# Patient Record
Sex: FEMALE | Race: WHITE | NOT HISPANIC OR LATINO | RURAL
[De-identification: names, ages, dates, MRNs, and addresses within clinical notes are randomized per-mention and may not be internally consistent; named-entity substitution may affect disease eponyms.]

---
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
## 2024-02-26 ENCOUNTER — TELEPHONE (OUTPATIENT)
Dept: FAMILY MEDICINE CLINIC | Facility: CLINIC | Age: 19
End: 2024-02-26
Payer: COMMERCIAL

## 2024-02-26 NOTE — TELEPHONE ENCOUNTER
"Caller: Bobo Odom \"Bobo odom\"    Relationship: Self    Best call back number: [phone] OKAY LEAVE MESSAGE    Who is your current provider: NA     Is your current provider offboarding? NO    Who would you like your new provider to be: DR KAITLYN SCHNEIDER    What are your reasons for transferring care: PATIENT DOES NOT HAVE A PCP    Additional notes: [facility] VALENCIA HAS REFERRED PATIENT TO DR SCHNEIDER.    [facility] STATED THAT HER ENTIRE FAMILY COMES TO DR SCHNEIDER AND THE MEN SEE DR CARVER .     PATIENT HAS NOT HAD PERIOD FOR THREE YEARS UNLESS TAKING BIRTH CONTROL.    HAD TONSILS REMOVED NOVEMBER 2023 AND HER NECK IS SWOLLEN.    CONCERNED SHE MAY HAVE AN ISSUE WITH HER THYROID.    "

## 2024-02-26 NOTE — TELEPHONE ENCOUNTER
Spoke with the patient, she is going to come in for a new patient visit on 03/12/2024. She has not seen gynecology ever before and she has not seen a PCP since childhood. She was prescribed birth control via a telemed doctor. She discontinued the birth control about 2-3 years ago due to nausea and she has not had a period since. She also informed me that she did follow up with the ENT 1 time after her tonsillectomy and that was it. She states she has had a swollen throat/neck since around middle school.

## 2024-02-26 NOTE — TELEPHONE ENCOUNTER
Yes, should be fine to schedule as a new patient.  Please find out if she has been seen by gynecology and if so please obtain any pertinent records.  As far as neck swelling, has she had any follow-up with ENT since her tonsillectomy?

## 2024-03-11 NOTE — PROGRESS NOTES
Chief Complaint  Establish Care and Menstrual Problem    History of Present Illness  Bobo Odom presents today to establish care and discuss irregular periods    Live with family friend Lakshmi. DO not do well living with mom. Triggers her anxiety.     She states she has had a swollen throat/neck since around middle school.  She has had tonsillectomy, she did follow up with the ENT following the tonsillectomy but did not continue to follow.    patient did have a Laparoscopic appendectomy with Dr. Almonte 6 days ago at [facility]. Have follow up on 3/20/24. They did do a pelvic exam during the evaluation of her abdominal pain.  They did start her on Zoloft while in the hospital for her anxiety. She is afraid of medication but only took once.  She is requesting to change to Lexapro because her sister is taking Lexapro and doing well with the medication.  We are calling to get this record.  Told thyroid labs were normal ovaries appeared normal without cysts on pelvic ultrasound and laparoscopic exam.     Report panic attack 2-3 times a week about 5 minutes but feel much longer. Feel the room is spinning cannot breath, hyperventilate, feel like having a heart attack and going to die, feel shaky.   Deep breathing or call mamaw and she will talk her through it.     Menstrual Irregularity:  Bobo Odom is here today to discuss  not having period in 2-3 years .  She is of childbearing age. She is sexually active. Associated symptoms include abdominal cramps. Pertinent surgical history includes none.   Periods were regular from age 13 to 15. Started birth control with onset of sexual activity. Stopped when moved to [place].   Not currently using birth control. And have never been pregnant.  She has not previously seen a gynecologist and has not seen a PCP since childhood. She was prescribed birth control via a telemed doctor. She discontinued the birth control about 3 years ago due to nausea and she has  "not had a period since.  Do have excessive facial hair stating around age 15 OCP's helped some. Since off hormones, have to wax or shave daily.     Influenza immunization was not given due to patient refusal.    Patient Care Team:  Polly Martinez MD as PCP - General (Family Medicine)   Current Outpatient Medications on File Prior to Visit   Medication Sig    amoxicillin-clavulanate (AUGMENTIN) 875-125 MG per tablet Take 1 tablet by mouth Every 12 (Twelve) Hours.    [DISCONTINUED] sertraline (ZOLOFT) 25 MG tablet Take 1 tablet by mouth Daily.    HYDROcodone-acetaminophen (NORCO) 5-325 MG per tablet Take 1 tablet by mouth Every 6 (Six) Hours As Needed. for pain (Patient not taking: Reported on 3/12/2024)     No current facility-administered medications on file prior to visit.       Objective   Vital Signs:   /74 (BP Location: Right arm, Patient Position: Sitting, Cuff Size: Adult)   Pulse (!) 124   Temp 98.2 °F (36.8 °C) (Temporal)   Resp 18   Ht 170.2 cm (67\")   Wt 87.2 kg (192 lb 3.2 oz)   SpO2 97%   BMI 30.10 kg/m²    BP Readings from Last 3 Encounters:   03/12/24 126/74     Wt Readings from Last 3 Encounters:   03/12/24 87.2 kg (192 lb 3.2 oz) (97%, Z= 1.83)*     * Growth percentiles are based on CDC (Girls, 2-20 Years) data.         Physical Exam  Vitals and nursing note reviewed.   Constitutional:       General: She is not in acute distress.     Appearance: Normal appearance. She is well-developed. She is obese. She is not ill-appearing.   HENT:      Head: Normocephalic and atraumatic.      Right Ear: Tympanic membrane, ear canal and external ear normal. There is no impacted cerumen.      Left Ear: Tympanic membrane, ear canal and external ear normal. There is no impacted cerumen.      Nose: No congestion.      Mouth/Throat:      Mouth: Mucous membranes are moist.   Eyes:      Conjunctiva/sclera: Conjunctivae normal.      Pupils: Pupils are equal, round, and reactive to light.   Neck:     " " Thyroid: No thyromegaly.      Vascular: No JVD.   Cardiovascular:      Rate and Rhythm: Normal rate and regular rhythm.      Heart sounds: Normal heart sounds. No murmur heard.  Pulmonary:      Effort: Pulmonary effort is normal.      Breath sounds: Normal breath sounds. No wheezing or rales.   Abdominal:      General: Bowel sounds are normal.      Tenderness: There is abdominal tenderness.      Comments: Truncal obesity.  Surgical bandages with Steri-Strips and gauze still in place on abdomen.  Visible trocar incision sites are dry without surrounding erythema, healing as expected.   Musculoskeletal:         General: Normal range of motion.      Cervical back: Normal range of motion.   Lymphadenopathy:      Cervical: No cervical adenopathy.   Skin:     General: Skin is warm and dry.      Findings: No rash.      Comments: Acanthosis nigricans, bases of dark hairs chin and upper lip   Neurological:      Mental Status: She is alert and oriented to person, place, and time.   Psychiatric:         Mood and Affect: Mood normal.         Behavior: Behavior normal.         Thought Content: Thought content normal.      Comments: Anxious, pressured speech            No visits with results within 1 Day(s) from this visit.   Latest known visit with results is:   No results found for any previous visit.       No results found for: \"CHOL\", \"CHLPL\", \"TRIG\", \"HDL\", \"LDL\", \"LDLDIRECT\"  No results found for: \"TSH\", \"B0FIOPJ\", \"B6AGRAO\", \"THYROIDAB\"  No results found for: \"GLUCOSE\", \"BUN\", \"CREATININE\", \"EGFRIFNONA\", \"EGFRIFAFRI\", \"BCR\", \"K\", \"CO2\", \"CALCIUM\", \"PROTENTOTREF\", \"ALBUMIN\", \"LABIL2\", \"BILIRUBIN\", \"AST\", \"ALT\"  No results found for: \"WBC\", \"HGB\", \"HCT\", \"MCV\", \"PLT\"              Labs 3/6/2024 preoperatively at [facility] CBC with white count of 20.5   lactate 3.0 urinalysis negative sodium 135, glucose 115  Labs 3/8/2024 postoperatively the day of discharge white count down to 12.1 hemoglobin " down to 0.8 hematocrit 30.6% neutrophils elevated at 63% ALT improved to 81 AST 57     Assessment and Plan    Diagnoses and all orders for this visit:    1. Encounter to establish care (Primary)    2. Amenorrhea  -     CBC auto differential  -     DHEA-sulfate  -     Ferritin  -     Follicle stimulating hormone  -     Luteinizing hormone  -     Prolactin  -     Testosterone Free Direct  -     TSH  -     Cancel: US Thyroid    3. Enlarged thyroid gland  -     Cancel: US Thyroid  -     US Thyroid; Future    4. Elevated LFTs  -     Comprehensive metabolic panel    5. Acanthosis nigricans    6. Hirsutism  -     DHEA-sulfate  -     Testosterone Free Direct    7. Anxiety  -     hydrOXYzine (ATARAX) 25 MG tablet; Take 1 tablet by mouth 3 (Three) Times a Day As Needed for Anxiety. For panic attacks  Dispense: 20 tablet; Refill: 0    8. Panic attacks  -     escitalopram (Lexapro) 10 MG tablet; 1/2 tablet for 1st 2 days (up to 1 week)  then increase to 1 daily  Dispense: 30 tablet; Refill: 0  -     hydrOXYzine (ATARAX) 25 MG tablet; Take 1 tablet by mouth 3 (Three) Times a Day As Needed for Anxiety. For panic attacks  Dispense: 20 tablet; Refill: 0    9. Current nicotine use    10. BMI 30.0-30.9,adult    11. Rapid weight gain    12. Hepatic steatosis  -     Comprehensive metabolic panel    13. Need for hepatitis C screening test  -     Hepatitis C Antibody      Patient is 1 week status post laparoscopic appendectomy.  Patient had additional testing prior to appendectomy that was helpful in her evaluation hirsutism.  Specifically she had a pelvic ultrasound that was negative for ovarian cysts or uterine masses with a endometrial stripe measuring 2.3 mm in her uterus.  CT of abdomen and pelvis with contrast noted prior and following appendectomy changes consistent with appendicitis and subsequent appendectomy and moderate to severe hepatic steatosis but no uterine or ovarian abnormalities are noted.  She did have a negative  pregnancy test.  Abnormal labs listed above.  Ordering labs as above as well as thyroid ultrasound for enlarged thyroid/goiter.  Did discuss proper postoperative wound care would recommend removing gauze type bandages and surgical tape leaving Steri-Strips in place until they start peeling up on their own and cleansing with soap and water at least once daily    Patient claims pelvic exam was performed in emergency department I cannot find record of any STI testing.  Patient is sexually active without condoms will need to do pelvic exam on return visit.    Medications Discontinued During This Encounter   Medication Reason    sertraline (ZOLOFT) 25 MG tablet Alternate therapy       I spent 70+ minutes caring for Bobo on this date of service. This time includes time spent by me in the following activities:preparing for the visit, reviewing tests, obtaining and/or reviewing a separately obtained history, performing a medically appropriate examination and/or evaluation , counseling and educating the patient/family/caregiver, ordering medications, tests, or procedures, documenting information in the medical record, and care coordination  Follow Up     Return in about 4 weeks (around 4/9/2024) for Recheck anxiety/panic, amenorrhea, hirsutism, steatohepatitis etc..    Patient was given instructions and counseling regarding her condition or for health maintenance advice. Please see specific information pulled into the AVS if appropriate.

## 2024-03-12 ENCOUNTER — TELEPHONE (OUTPATIENT)
Dept: FAMILY MEDICINE CLINIC | Facility: CLINIC | Age: 19
End: 2024-03-12

## 2024-03-12 ENCOUNTER — OFFICE VISIT (OUTPATIENT)
Dept: FAMILY MEDICINE CLINIC | Facility: CLINIC | Age: 19
End: 2024-03-12
Payer: COMMERCIAL

## 2024-03-12 ENCOUNTER — PATIENT ROUNDING (BHMG ONLY) (OUTPATIENT)
Dept: FAMILY MEDICINE CLINIC | Facility: CLINIC | Age: 19
End: 2024-03-12
Payer: COMMERCIAL

## 2024-03-12 VITALS
WEIGHT: 192.2 LBS | HEIGHT: 67 IN | HEART RATE: 124 BPM | OXYGEN SATURATION: 97 % | SYSTOLIC BLOOD PRESSURE: 126 MMHG | TEMPERATURE: 98.2 F | BODY MASS INDEX: 30.17 KG/M2 | DIASTOLIC BLOOD PRESSURE: 74 MMHG | RESPIRATION RATE: 18 BRPM

## 2024-03-12 DIAGNOSIS — R79.89 ELEVATED LFTS: ICD-10-CM

## 2024-03-12 DIAGNOSIS — L83 ACANTHOSIS NIGRICANS: ICD-10-CM

## 2024-03-12 DIAGNOSIS — F41.9 ANXIETY: ICD-10-CM

## 2024-03-12 DIAGNOSIS — Z11.59 NEED FOR HEPATITIS C SCREENING TEST: ICD-10-CM

## 2024-03-12 DIAGNOSIS — K76.0 HEPATIC STEATOSIS: ICD-10-CM

## 2024-03-12 DIAGNOSIS — E04.9 ENLARGED THYROID GLAND: ICD-10-CM

## 2024-03-12 DIAGNOSIS — F41.0 PANIC ATTACKS: ICD-10-CM

## 2024-03-12 DIAGNOSIS — L68.0 HIRSUTISM: ICD-10-CM

## 2024-03-12 DIAGNOSIS — R63.5 RAPID WEIGHT GAIN: ICD-10-CM

## 2024-03-12 DIAGNOSIS — Z72.0 CURRENT NICOTINE USE: ICD-10-CM

## 2024-03-12 DIAGNOSIS — N91.2 AMENORRHEA: ICD-10-CM

## 2024-03-12 DIAGNOSIS — Z76.89 ENCOUNTER TO ESTABLISH CARE: Primary | ICD-10-CM

## 2024-03-12 RX ORDER — ESCITALOPRAM OXALATE 10 MG/1
TABLET ORAL
Qty: 30 TABLET | Refills: 0 | Status: SHIPPED | OUTPATIENT
Start: 2024-03-12

## 2024-03-12 RX ORDER — HYDROCODONE BITARTRATE AND ACETAMINOPHEN 5; 325 MG/1; MG/1
1 TABLET ORAL EVERY 6 HOURS PRN
COMMUNITY
Start: 2024-03-08

## 2024-03-12 RX ORDER — AMOXICILLIN AND CLAVULANATE POTASSIUM 875; 125 MG/1; MG/1
1 TABLET, FILM COATED ORAL EVERY 12 HOURS SCHEDULED
COMMUNITY
Start: 2024-03-08

## 2024-03-12 RX ORDER — SERTRALINE HYDROCHLORIDE 25 MG/1
25 TABLET, FILM COATED ORAL DAILY
COMMUNITY
Start: 2024-03-08 | End: 2024-03-12 | Stop reason: ALTCHOICE

## 2024-03-12 RX ORDER — HYDROXYZINE HYDROCHLORIDE 25 MG/1
25 TABLET, FILM COATED ORAL 3 TIMES DAILY PRN
Qty: 20 TABLET | Refills: 0 | Status: SHIPPED | OUTPATIENT
Start: 2024-03-12

## 2024-03-12 NOTE — PROGRESS NOTES
March 12, 2024    Hello, may I speak with Bobo Odom?    My name is Kylah Vicente     I am  with [facility] [address]  [facility]  [address] [place] [address] [address]  [place] [address].    Before we get started may I verify your date of birth? 2005    I am calling to officially welcome you to our practice and ask about your recent visit. Is this a good time to talk? yes    Tell me about your visit with us. What things went well?  good visit       We're always looking for ways to make our patients' experiences even better. Do you have recommendations on ways we may improve?  no    Overall were you satisfied with your first visit to our practice? yes       I appreciate you taking the time to speak with me today. Is there anything else I can do for you? no      Thank you, and have a great day.

## 2024-04-05 DIAGNOSIS — F41.0 PANIC ATTACKS: ICD-10-CM

## 2024-04-05 RX ORDER — ESCITALOPRAM OXALATE 10 MG/1
TABLET ORAL
Qty: 30 TABLET | Refills: 0 | OUTPATIENT
Start: 2024-04-05

## 2024-04-08 NOTE — PROGRESS NOTES
"Answers submitted by the patient for this visit:  Other (Submitted on 4/8/2024)  Please describe your symptoms.: High blood pressure  Have you had these symptoms before?: Yes  How long have you been having these symptoms?: 1-4 days  Please describe any probable cause for these symptoms. : Symptoms have lasted 4 or 5 days this time., This happens every so often, normally once a week and only for that 1 day.  Primary Reason for Visit (Submitted on 4/8/2024)  What is the primary reason for your visit?: Other  Chief Complaint  Hypertension    History of Present Illness  Bobo Odom presents today for new concern of hypertension and dizziness    Hypertension  Patient does check blood pressure at home.   Home readings range from 150's/100's to 140's/90's per patient/patient submitted blood pressure diary.  Patient reports  headache and dizziness    Onset of this was 04/05/2024 while at work but was a low stress day.     Have not yet had labs as ordered last month    Patient Care Team:  Polly Martinez MD as PCP - General (Family Medicine)   Current Outpatient Medications on File Prior to Visit   Medication Sig    escitalopram (Lexapro) 10 MG tablet 1/2 tablet for 1st 2 days (up to 1 week)  then increase to 1 daily (Patient taking differently: Take 1 tablet by mouth Daily. 1/2 tablet for 1st 2 days (up to 1 week)  then increase to 1 daily)    hydrOXYzine (ATARAX) 25 MG tablet Take 1 tablet by mouth 3 (Three) Times a Day As Needed for Anxiety. For panic attacks     No current facility-administered medications on file prior to visit.       Objective   Vital Signs:   /94 (BP Location: Right arm, Patient Position: Sitting, Cuff Size: Large Adult)   Pulse (!) 124   Temp 98 °F (36.7 °C) (Temporal)   Resp 18   Ht 170.2 cm (67\")   Wt 91.2 kg (201 lb)   SpO2 98%   BMI 31.48 kg/m²    BP Readings from Last 3 Encounters:   04/15/24 138/82   04/09/24 142/94   03/12/24 126/74     Wt Readings from Last 3 " Encounters:   04/15/24 89.5 kg (197 lb 6.4 oz) (97%, Z= 1.91)*   04/09/24 91.2 kg (201 lb) (98%, Z= 1.96)*   03/12/24 87.2 kg (192 lb 3.2 oz) (97%, Z= 1.83)*     * Growth percentiles are based on [facility] (Girls, 2-20 Years) data.         Physical Exam  Vitals and nursing note reviewed.   Constitutional:       General: She is not in acute distress.     Appearance: She is well-developed.   HENT:      Head: Normocephalic and atraumatic.   Cardiovascular:      Rate and Rhythm: Normal rate and regular rhythm.      Heart sounds: No murmur heard.  Pulmonary:      Effort: Pulmonary effort is normal.      Breath sounds: Normal breath sounds. No wheezing.   Musculoskeletal:         General: Normal range of motion.   Skin:     General: Skin is warm and dry.      Findings: No rash.   Neurological:      Mental Status: She is alert and oriented to person, place, and time.   Psychiatric:      Comments: Rapid pressured speech            No visits with results within 1 Day(s) from this visit.   Latest known visit with results is:   Office Visit on 03/12/2024   Component Date Value Ref Range Status    DHEA-Sulfate 04/12/2024 421.0  110.0 - 433.2 ug/dL Final    Ferritin 04/12/2024 224 (H)  15 - 77 ng/mL Final    FSH 04/12/2024 3.2  mIU/mL Final    Comment:                      Adult Female             Range                        Follicular phase      3.5 -  12.5                        Ovulation phase       4.7 -  21.5                        Luteal phase          1.7 -   7.7                        Postmenopausal       25.8 - 134.8      LH 04/12/2024 10.7  mIU/mL Final    Comment:                      Adult Female              Range                        Follicular phase      2.4 -  12.6                        Ovulation phase      14.0 -  95.6                        Luteal phase          1.0 -  11.4                        Postmenopausal        7.7 -  58.5      Prolactin 04/12/2024 16.4  4.8 - 33.4 ng/mL Final    TSH 04/12/2024 2.610   0.450 - 4.500 uIU/mL Final    Glucose 04/12/2024 172 (H)  70 - 99 mg/dL Final    BUN 04/12/2024 11  6 - 20 mg/dL Final    Creatinine 04/12/2024 0.57  0.57 - 1.00 mg/dL Final    EGFR Result 04/12/2024 134  >59 mL/min/1.73 Final    BUN/Creatinine Ratio 04/12/2024 19  9 - 23 Final    Sodium 04/12/2024 140  134 - 144 mmol/L Final    Potassium 04/12/2024 3.9  3.5 - 5.2 mmol/L Final    Chloride 04/12/2024 103  96 - 106 mmol/L Final    Total CO2 04/12/2024 21  20 - 29 mmol/L Final    Calcium 04/12/2024 9.8  8.7 - 10.2 mg/dL Final    Total Protein 04/12/2024 7.8  6.0 - 8.5 g/dL Final    Albumin 04/12/2024 4.8  4.0 - 5.0 g/dL Final    Globulin 04/12/2024 3.0  1.5 - 4.5 g/dL Final    A/G Ratio 04/12/2024 1.6  1.2 - 2.2 Final    Total Bilirubin 04/12/2024 0.2  0.0 - 1.2 mg/dL Final    Alkaline Phosphatase 04/12/2024 118 (H)  42 - 106 IU/L Final    AST (SGOT) 04/12/2024 79 (H)  0 - 40 IU/L Final    ALT (SGPT) 04/12/2024 142 (H)  0 - 32 IU/L Final    Hep C Virus Ab 04/12/2024 Non Reactive  Non Reactive Final    Comment: HCV antibody alone does not differentiate between previously  resolved infection and active infection. Equivocal and Reactive  HCV antibody results should be followed up with an HCV RNA test  to support the diagnosis of active HCV infection.      WBC 04/12/2024 8.1  3.4 - 10.8 x10E3/uL Final    RBC 04/12/2024 5.12  3.77 - 5.28 x10E6/uL Final    Hemoglobin 04/12/2024 14.5  11.1 - 15.9 g/dL Final    Hematocrit 04/12/2024 43.6  34.0 - 46.6 % Final    MCV 04/12/2024 85  79 - 97 fL Final    MCH 04/12/2024 28.3  26.6 - 33.0 pg Final    MCHC 04/12/2024 33.3  31.5 - 35.7 g/dL Final    RDW 04/12/2024 12.6  11.7 - 15.4 % Final    Platelets 04/12/2024 375  150 - 450 x10E3/uL Final    Neutrophil Rel % 04/12/2024 50  Not Estab. % Final    Lymphocyte Rel % 04/12/2024 37  Not Estab. % Final    Monocyte Rel % 04/12/2024 9  Not Estab. % Final    Eosinophil Rel % 04/12/2024 2  Not Estab. % Final    Basophil Rel % 04/12/2024 1  Not  "Estab. % Final    Neutrophils Absolute 04/12/2024 4.1  1.4 - 7.0 x10E3/uL Final    Lymphocytes Absolute 04/12/2024 3.0  0.7 - 3.1 x10E3/uL Final    Monocytes Absolute 04/12/2024 0.7  0.1 - 0.9 x10E3/uL Final    Eosinophils Absolute 04/12/2024 0.1  0.0 - 0.4 x10E3/uL Final    Basophils Absolute 04/12/2024 0.1  0.0 - 0.2 x10E3/uL Final    Immature Granulocyte Rel % 04/12/2024 1  Not Estab. % Final    Immature Grans Absolute 04/12/2024 0.1  0.0 - 0.1 x10E3/uL Final     A1C Last 3 Results          4/15/2024    14:17   HGBA1C Last 3 Results   Hemoglobin A1C 5.9      No results found for: \"CHOL\", \"CHLPL\", \"TRIG\", \"HDL\", \"LDL\", \"LDLDIRECT\"  Lab Results   Component Value Date    TSH 2.610 04/12/2024     Lab Results   Component Value Date    GLUCOSE 172 (H) 04/12/2024    BUN 11 04/12/2024    CREATININE 0.57 04/12/2024    BCR 19 04/12/2024    K 3.9 04/12/2024    CO2 21 04/12/2024    CALCIUM 9.8 04/12/2024    PROTENTOTREF 7.8 04/12/2024    ALBUMIN 4.8 04/12/2024    LABIL2 1.6 04/12/2024    AST 79 (H) 04/12/2024     (H) 04/12/2024     Lab Results   Component Value Date    WBC 8.1 04/12/2024    HGB 14.5 04/12/2024    HCT 43.6 04/12/2024    MCV 85 04/12/2024     04/12/2024                      Assessment and Plan    Diagnoses and all orders for this visit:    1. Hypertension, unspecified type (Primary)  -     Discontinue: metoprolol succinate XL (TOPROL-XL) 25 MG 24 hr tablet; Take 1 tablet by mouth Daily.  Dispense: 30 tablet; Refill: 1    2. Dizziness    3. Body mass index (BMI) of 31.0 to 31.9 in adult    4. Current nicotine use    5. Amenorrhea    6. Anxiety      New diagnosis of hypertension.  Starting on metoprolol, hopefully will help with elevated blood pressure, as well as heart rate and possibly somewhat with anxiety.  reminded to get labs as previously ordered working up irregular menstrual cycle.  Considered adding urinary catecholamines/VMA but patient does not want to commit to doing a 24-hour urine " collection, checking other labs first..    Anxiety, advised to increase Lexapro to 10 mg as previously directed.    Medications Discontinued During This Encounter   Medication Reason    amoxicillin-clavulanate (AUGMENTIN) 875-125 MG per tablet *Therapy completed    HYDROcodone-acetaminophen (NORCO) 5-325 MG per tablet *Therapy completed         Follow Up     Return in about 2 weeks (around 4/23/2024) for Recheck.    Patient was given instructions and counseling regarding her condition or for health maintenance advice. Please see specific information pulled into the AVS if appropriate.

## 2024-04-09 ENCOUNTER — TELEPHONE (OUTPATIENT)
Dept: FAMILY MEDICINE CLINIC | Facility: CLINIC | Age: 19
End: 2024-04-09

## 2024-04-09 ENCOUNTER — OFFICE VISIT (OUTPATIENT)
Dept: FAMILY MEDICINE CLINIC | Facility: CLINIC | Age: 19
End: 2024-04-09
Payer: COMMERCIAL

## 2024-04-09 VITALS
HEART RATE: 124 BPM | BODY MASS INDEX: 31.55 KG/M2 | TEMPERATURE: 98 F | HEIGHT: 67 IN | WEIGHT: 201 LBS | DIASTOLIC BLOOD PRESSURE: 94 MMHG | RESPIRATION RATE: 18 BRPM | OXYGEN SATURATION: 98 % | SYSTOLIC BLOOD PRESSURE: 142 MMHG

## 2024-04-09 DIAGNOSIS — Z72.0 CURRENT NICOTINE USE: ICD-10-CM

## 2024-04-09 DIAGNOSIS — R42 DIZZINESS: ICD-10-CM

## 2024-04-09 DIAGNOSIS — F41.9 ANXIETY: ICD-10-CM

## 2024-04-09 DIAGNOSIS — N91.2 AMENORRHEA: ICD-10-CM

## 2024-04-09 DIAGNOSIS — I10 HYPERTENSION, UNSPECIFIED TYPE: Primary | ICD-10-CM

## 2024-04-09 PROCEDURE — 99214 OFFICE O/P EST MOD 30 MIN: CPT | Performed by: FAMILY MEDICINE

## 2024-04-09 RX ORDER — METOPROLOL SUCCINATE 25 MG/1
25 TABLET, EXTENDED RELEASE ORAL DAILY
Qty: 30 TABLET | Refills: 1 | Status: SHIPPED | OUTPATIENT
Start: 2024-04-09 | End: 2024-04-15 | Stop reason: SDUPTHER

## 2024-04-09 NOTE — TELEPHONE ENCOUNTER
Called [facility] medical records to obtain after hours care report from yesterday after patient stated they were going due to htn. Medical records states patient did not come to after hours care.

## 2024-04-11 ENCOUNTER — TELEPHONE (OUTPATIENT)
Dept: FAMILY MEDICINE CLINIC | Facility: CLINIC | Age: 19
End: 2024-04-11
Payer: COMMERCIAL

## 2024-04-11 NOTE — TELEPHONE ENCOUNTER
Spoke with the patient and reminded her of overdue labs, she voiced understanding and plans to go tomorrow.

## 2024-04-12 NOTE — PROGRESS NOTES
"Chief Complaint  Anxiety and Labs Only    History of Present Illness  Bobo Odom presents today for follow up on anxiety, hypertension, and lab results      Anxiety/Depression  Associated symptoms include: anxiety , previous flushed face, dizziness, and headache have improved that she will continue to have a flushed face especially about half an hour after she is taking metoprolol is she is trying to go to bed at night.    Severity is described per patient : Mild  Patient reports they are taking medications as prescribed and they are not having side effects.  Side effects include none    Hypertension, last visit started on low-dose metoprolol.  Home blood pressure readings are now ranging from 137/97 to 160/80.  Previously was having diastolic as high as 104.    Patient Care Team:  Polly Martinez MD as PCP - General (Family Medicine)   Current Outpatient Medications on File Prior to Visit   Medication Sig    escitalopram (Lexapro) 10 MG tablet 1/2 tablet for 1st 2 days (up to 1 week)  then increase to 1 daily (Patient taking differently: Take 1 tablet by mouth Daily. 1/2 tablet for 1st 2 days (up to 1 week)  then increase to 1 daily)    hydrOXYzine (ATARAX) 25 MG tablet Take 1 tablet by mouth 3 (Three) Times a Day As Needed for Anxiety. For panic attacks    [DISCONTINUED] metoprolol succinate XL (TOPROL-XL) 25 MG 24 hr tablet Take 1 tablet by mouth Daily.     No current facility-administered medications on file prior to visit.       Objective   Vital Signs:   /82 (BP Location: Right arm, Patient Position: Sitting, Cuff Size: Large Adult)   Pulse 116   Temp 98.3 °F (36.8 °C) (Temporal)   Resp 18   Ht 170.2 cm (67\")   Wt 89.5 kg (197 lb 6.4 oz)   SpO2 98%   BMI 30.92 kg/m²    BP Readings from Last 3 Encounters:   04/15/24 138/82   04/09/24 142/94   03/12/24 126/74     Wt Readings from Last 3 Encounters:   04/15/24 89.5 kg (197 lb 6.4 oz) (97%, Z= 1.91)*   04/09/24 91.2 kg (201 lb) (98%, Z= " 1.96)*   03/12/24 87.2 kg (192 lb 3.2 oz) (97%, Z= 1.83)*     * Growth percentiles are based on CDC (Girls, 2-20 Years) data.         Physical Exam  Vitals and nursing note reviewed.   Constitutional:       General: She is not in acute distress.     Appearance: She is well-developed.   HENT:      Head: Normocephalic and atraumatic.   Cardiovascular:      Rate and Rhythm: Normal rate and regular rhythm.      Heart sounds: No murmur heard.  Pulmonary:      Effort: Pulmonary effort is normal.      Breath sounds: Normal breath sounds. No wheezing.   Abdominal:      General: Bowel sounds are normal.      Tenderness: There is no abdominal tenderness.      Comments: Obese   Musculoskeletal:         General: Normal range of motion.   Skin:     General: Skin is warm and dry.      Findings: No rash.   Neurological:      Mental Status: She is alert and oriented to person, place, and time.   Psychiatric:      Comments: Rapid pressured tangential speech        Office Visit on 04/15/2024   Component Date Value Ref Range Status    Hemoglobin A1C 04/15/2024 5.9 (A)  4.5 - 5.7 % Final    Lot Number 04/15/2024 [id number]   Final    Expiration Date 04/15/2024 01/31/2026   Final   Office Visit on 03/12/2024   Component Date Value Ref Range Status    DHEA-Sulfate 04/12/2024 421.0  110.0 - 433.2 ug/dL Final    Ferritin 04/12/2024 224 (H)  15 - 77 ng/mL Final    FSH 04/12/2024 3.2  mIU/mL Final    Comment:                      Adult Female             Range                        Follicular phase      3.5 -  12.5                        Ovulation phase       4.7 -  21.5                        Luteal phase          1.7 -   7.7                        Postmenopausal       25.8 - 134.8      LH 04/12/2024 10.7  mIU/mL Final    Comment:                      Adult Female              Range                        Follicular phase      2.4 -  12.6                        Ovulation phase      14.0 -  95.6                        Luteal phase          1.0  -  11.4                        Postmenopausal        7.7 -  58.5      Prolactin 04/12/2024 16.4  4.8 - 33.4 ng/mL Final    TSH 04/12/2024 2.610  0.450 - 4.500 uIU/mL Final    Glucose 04/12/2024 172 (H)  70 - 99 mg/dL Final    BUN 04/12/2024 11  6 - 20 mg/dL Final    Creatinine 04/12/2024 0.57  0.57 - 1.00 mg/dL Final    EGFR Result 04/12/2024 134  >59 mL/min/1.73 Final    BUN/Creatinine Ratio 04/12/2024 19  9 - 23 Final    Sodium 04/12/2024 140  134 - 144 mmol/L Final    Potassium 04/12/2024 3.9  3.5 - 5.2 mmol/L Final    Chloride 04/12/2024 103  96 - 106 mmol/L Final    Total CO2 04/12/2024 21  20 - 29 mmol/L Final    Calcium 04/12/2024 9.8  8.7 - 10.2 mg/dL Final    Total Protein 04/12/2024 7.8  6.0 - 8.5 g/dL Final    Albumin 04/12/2024 4.8  4.0 - 5.0 g/dL Final    Globulin 04/12/2024 3.0  1.5 - 4.5 g/dL Final    A/G Ratio 04/12/2024 1.6  1.2 - 2.2 Final    Total Bilirubin 04/12/2024 0.2  0.0 - 1.2 mg/dL Final    Alkaline Phosphatase 04/12/2024 118 (H)  42 - 106 IU/L Final    AST (SGOT) 04/12/2024 79 (H)  0 - 40 IU/L Final    ALT (SGPT) 04/12/2024 142 (H)  0 - 32 IU/L Final    Hep C Virus Ab 04/12/2024 Non Reactive  Non Reactive Final    Comment: HCV antibody alone does not differentiate between previously  resolved infection and active infection. Equivocal and Reactive  HCV antibody results should be followed up with an HCV RNA test  to support the diagnosis of active HCV infection.      WBC 04/12/2024 8.1  3.4 - 10.8 x10E3/uL Final    RBC 04/12/2024 5.12  3.77 - 5.28 x10E6/uL Final    Hemoglobin 04/12/2024 14.5  11.1 - 15.9 g/dL Final    Hematocrit 04/12/2024 43.6  34.0 - 46.6 % Final    MCV 04/12/2024 85  79 - 97 fL Final    MCH 04/12/2024 28.3  26.6 - 33.0 pg Final    MCHC 04/12/2024 33.3  31.5 - 35.7 g/dL Final    RDW 04/12/2024 12.6  11.7 - 15.4 % Final    Platelets 04/12/2024 375  150 - 450 x10E3/uL Final    Neutrophil Rel % 04/12/2024 50  Not Estab. % Final    Lymphocyte Rel % 04/12/2024 37  Not Estab. %  Final    Monocyte Rel % 04/12/2024 9  Not Estab. % Final    Eosinophil Rel % 04/12/2024 2  Not Estab. % Final    Basophil Rel % 04/12/2024 1  Not Estab. % Final    Neutrophils Absolute 04/12/2024 4.1  1.4 - 7.0 x10E3/uL Final    Lymphocytes Absolute 04/12/2024 3.0  0.7 - 3.1 x10E3/uL Final    Monocytes Absolute 04/12/2024 0.7  0.1 - 0.9 x10E3/uL Final    Eosinophils Absolute 04/12/2024 0.1  0.0 - 0.4 x10E3/uL Final    Basophils Absolute 04/12/2024 0.1  0.0 - 0.2 x10E3/uL Final    Immature Granulocyte Rel % 04/12/2024 1  Not Estab. % Final    Immature Grans Absolute 04/12/2024 0.1  0.0 - 0.1 x10E3/uL Final                            Assessment and Plan    Diagnoses and all orders for this visit:    1. Anxiety (Primary)    2. BMI 30.0-30.9,adult  -     POC Glycosylated Hemoglobin (Hb A1C)    3. Current nicotine use    4. Elevated LFTs  -     Ambulatory Referral to Endocrinology    5. Elevated ferritin level  -     Ambulatory Referral to Endocrinology    6. Hyperglycemia  -     POC Glycosylated Hemoglobin (Hb A1C)    7. Elevated glucose  -     POC Glycosylated Hemoglobin (Hb A1C)    8. Pre-diabetes  -     Ambulatory Referral to Endocrinology    9. Hepatic steatosis    10. Hypertension, unspecified type  -     Ambulatory Referral to Endocrinology  -     metoprolol succinate XL (TOPROL-XL) 50 MG 24 hr tablet; Take 1 tablet by mouth Daily.  Dispense: 30 tablet; Refill: 0    11. Amenorrhea  -     Ambulatory Referral to Endocrinology    12. Panic attacks    13. Hirsutism  -     Ambulatory Referral to Endocrinology    14. Acanthosis nigricans  -     Ambulatory Referral to Endocrinology      Amenorrhea with hirsutism and acanthosis nigricans now with sudden onset hypertension.  And new diagnosis of prediabetes pelvic ultrasound at [facility] did not reveal ovarian cysts, TSH, prolactin, LH, FSH, LDH EA are normal.  Testosterone was not completed.  Lab results as above do not reveal cause.  Referring to endocrinology for  further evaluation.  Did discuss possibility of starting metformin, spironolactone, and equal, or antiandrogenic birth control.  Patient is almost desperate to improve the hirsutism she prefers to see endocrinology before starting any of these treatments stating that her symptoms have been going on for years.    Hypertension minimally improved tolerating metoprolol, increasing to 50 mg daily.    Persistently elevated liver function tests with mild to moderate fatty infiltration of liver on CT scan at [facility].  Did discuss reduced calorie reduced fat diet and need for weight loss to reduce chance of developing diabetes.    Elevated ferritin level at 224.  Patient does eat a fairly high iron diet with red meat and spinach.  Advised to limit these foods but suspect high ferritin is less dietary and more inflammatory or related to hepatic disease.  Would like to refer to gastroenterology for further evaluation but patient prefers to see endocrinology first.    Medications Discontinued During This Encounter   Medication Reason    metoprolol succinate XL (TOPROL-XL) 25 MG 24 hr tablet Reorder         Follow Up     Return in about 4 weeks (around 5/13/2024) for Recheck, htn.    Patient was given instructions and counseling regarding her condition or for health maintenance advice. Please see specific information pulled into the AVS if appropriate.

## 2024-04-13 LAB
ALBUMIN SERPL-MCNC: 4.8 G/DL (ref 4–5)
ALBUMIN/GLOB SERPL: 1.6 {RATIO} (ref 1.2–2.2)
ALP SERPL-CCNC: 118 IU/L (ref 42–106)
ALT SERPL-CCNC: 142 IU/L (ref 0–32)
AST SERPL-CCNC: 79 IU/L (ref 0–40)
BASOPHILS # BLD AUTO: 0.1 X10E3/UL (ref 0–0.2)
BASOPHILS NFR BLD AUTO: 1 %
BILIRUB SERPL-MCNC: 0.2 MG/DL (ref 0–1.2)
BUN SERPL-MCNC: 11 MG/DL (ref 6–20)
BUN/CREAT SERPL: 19 (ref 9–23)
CALCIUM SERPL-MCNC: 9.8 MG/DL (ref 8.7–10.2)
CHLORIDE SERPL-SCNC: 103 MMOL/L (ref 96–106)
CO2 SERPL-SCNC: 21 MMOL/L (ref 20–29)
CREAT SERPL-MCNC: 0.57 MG/DL (ref 0.57–1)
DHEA-S SERPL-MCNC: 421 UG/DL (ref 110–433.2)
EGFRCR SERPLBLD CKD-EPI 2021: 134 ML/MIN/1.73
EOSINOPHIL # BLD AUTO: 0.1 X10E3/UL (ref 0–0.4)
EOSINOPHIL NFR BLD AUTO: 2 %
ERYTHROCYTE [DISTWIDTH] IN BLOOD BY AUTOMATED COUNT: 12.6 % (ref 11.7–15.4)
FERRITIN SERPL-MCNC: 224 NG/ML (ref 15–77)
FSH SERPL-ACNC: 3.2 MIU/ML
GLOBULIN SER CALC-MCNC: 3 G/DL (ref 1.5–4.5)
GLUCOSE SERPL-MCNC: 172 MG/DL (ref 70–99)
HCT VFR BLD AUTO: 43.6 % (ref 34–46.6)
HCV IGG SERPL QL IA: NON REACTIVE
HGB BLD-MCNC: 14.5 G/DL (ref 11.1–15.9)
IMM GRANULOCYTES # BLD AUTO: 0.1 X10E3/UL (ref 0–0.1)
IMM GRANULOCYTES NFR BLD AUTO: 1 %
LH SERPL-ACNC: 10.7 MIU/ML
LYMPHOCYTES # BLD AUTO: 3 X10E3/UL (ref 0.7–3.1)
LYMPHOCYTES NFR BLD AUTO: 37 %
MCH RBC QN AUTO: 28.3 PG (ref 26.6–33)
MCHC RBC AUTO-ENTMCNC: 33.3 G/DL (ref 31.5–35.7)
MCV RBC AUTO: 85 FL (ref 79–97)
MONOCYTES # BLD AUTO: 0.7 X10E3/UL (ref 0.1–0.9)
MONOCYTES NFR BLD AUTO: 9 %
NEUTROPHILS # BLD AUTO: 4.1 X10E3/UL (ref 1.4–7)
NEUTROPHILS NFR BLD AUTO: 50 %
PLATELET # BLD AUTO: 375 X10E3/UL (ref 150–450)
POTASSIUM SERPL-SCNC: 3.9 MMOL/L (ref 3.5–5.2)
PROLACTIN SERPL-MCNC: 16.4 NG/ML (ref 4.8–33.4)
PROT SERPL-MCNC: 7.8 G/DL (ref 6–8.5)
RBC # BLD AUTO: 5.12 X10E6/UL (ref 3.77–5.28)
SODIUM SERPL-SCNC: 140 MMOL/L (ref 134–144)
TSH SERPL DL<=0.005 MIU/L-ACNC: 2.61 UIU/ML (ref 0.45–4.5)
WBC # BLD AUTO: 8.1 X10E3/UL (ref 3.4–10.8)

## 2024-04-15 ENCOUNTER — OFFICE VISIT (OUTPATIENT)
Dept: FAMILY MEDICINE CLINIC | Facility: CLINIC | Age: 19
End: 2024-04-15
Payer: COMMERCIAL

## 2024-04-15 VITALS
OXYGEN SATURATION: 98 % | SYSTOLIC BLOOD PRESSURE: 138 MMHG | DIASTOLIC BLOOD PRESSURE: 82 MMHG | RESPIRATION RATE: 18 BRPM | WEIGHT: 197.4 LBS | HEIGHT: 67 IN | HEART RATE: 116 BPM | BODY MASS INDEX: 30.98 KG/M2 | TEMPERATURE: 98.3 F

## 2024-04-15 DIAGNOSIS — R79.89 ELEVATED FERRITIN LEVEL: ICD-10-CM

## 2024-04-15 DIAGNOSIS — F41.9 ANXIETY: Primary | ICD-10-CM

## 2024-04-15 DIAGNOSIS — R73.9 HYPERGLYCEMIA: ICD-10-CM

## 2024-04-15 DIAGNOSIS — I10 HYPERTENSION, UNSPECIFIED TYPE: ICD-10-CM

## 2024-04-15 DIAGNOSIS — R79.89 ELEVATED LFTS: ICD-10-CM

## 2024-04-15 DIAGNOSIS — R73.03 PRE-DIABETES: ICD-10-CM

## 2024-04-15 DIAGNOSIS — R73.09 ELEVATED GLUCOSE: ICD-10-CM

## 2024-04-15 DIAGNOSIS — Z72.0 CURRENT NICOTINE USE: ICD-10-CM

## 2024-04-15 DIAGNOSIS — F41.0 PANIC ATTACKS: ICD-10-CM

## 2024-04-15 DIAGNOSIS — L68.0 HIRSUTISM: ICD-10-CM

## 2024-04-15 DIAGNOSIS — N91.2 AMENORRHEA: ICD-10-CM

## 2024-04-15 DIAGNOSIS — K76.0 HEPATIC STEATOSIS: ICD-10-CM

## 2024-04-15 DIAGNOSIS — L83 ACANTHOSIS NIGRICANS: ICD-10-CM

## 2024-04-15 LAB
EXPIRATION DATE: ABNORMAL
HBA1C MFR BLD: 5.9 % (ref 4.5–5.7)
Lab: ABNORMAL

## 2024-04-15 PROCEDURE — 99214 OFFICE O/P EST MOD 30 MIN: CPT | Performed by: FAMILY MEDICINE

## 2024-04-15 PROCEDURE — 83036 HEMOGLOBIN GLYCOSYLATED A1C: CPT | Performed by: FAMILY MEDICINE

## 2024-04-15 RX ORDER — METOPROLOL SUCCINATE 50 MG/1
50 TABLET, EXTENDED RELEASE ORAL DAILY
Qty: 30 TABLET | Refills: 0 | Status: SHIPPED | OUTPATIENT
Start: 2024-04-15

## 2024-04-18 LAB — TESTOST FREE SERPL-MCNC: 4.4 PG/ML

## 2024-04-24 ENCOUNTER — HOSPITAL ENCOUNTER (OUTPATIENT)
Dept: ULTRASOUND IMAGING | Facility: HOSPITAL | Age: 19
Discharge: HOME | End: 2024-04-24
Admitting: FAMILY MEDICINE
Payer: COMMERCIAL

## 2024-04-24 DIAGNOSIS — E04.9 ENLARGED THYROID GLAND: ICD-10-CM

## 2024-04-24 PROCEDURE — 76536 US EXAM OF HEAD AND NECK: CPT

## 2024-04-29 DIAGNOSIS — F41.0 PANIC ATTACKS: ICD-10-CM

## 2024-04-30 RX ORDER — ESCITALOPRAM OXALATE 10 MG/1
10 TABLET ORAL DAILY
Qty: 30 TABLET | Refills: 0 | Status: SHIPPED | OUTPATIENT
Start: 2024-04-30

## 2024-05-10 DIAGNOSIS — I10 HYPERTENSION, UNSPECIFIED TYPE: ICD-10-CM

## 2024-05-10 RX ORDER — METOPROLOL SUCCINATE 50 MG/1
50 TABLET, EXTENDED RELEASE ORAL DAILY
Qty: 30 TABLET | Refills: 0 | OUTPATIENT
Start: 2024-05-10

## 2024-05-12 DIAGNOSIS — I10 HYPERTENSION, UNSPECIFIED TYPE: ICD-10-CM

## 2024-05-13 RX ORDER — METOPROLOL SUCCINATE 50 MG/1
50 TABLET, EXTENDED RELEASE ORAL DAILY
Qty: 30 TABLET | Refills: 0 | Status: SHIPPED | OUTPATIENT
Start: 2024-05-13

## 2024-05-15 NOTE — PROGRESS NOTES
Chief Complaint  Hypertension and Anxiety    History of Present Illness  Bobo Odom presents today for follow up on hypertension and anxiety      Hypertension  Patient does check blood pressure at home.   Home readings range from  130's/80-90's per patient/patient submitted blood pressure diary. BP was up to 135/95 last night following argument amongst others in household.   Patient denies  blurred vision, chest pain, dyspnea, headache, neck aches, orthopnea, palpitations, paroxysmal nocturnal dyspnea, peripheral edema, pulsating in the ears, and tiredness/fatigue   Patient reports they are taking medications as prescribed and they are not having side effects.  Active taking care of 12 chickens. Walking more, trying to stay active and avoiding junk food. Eating healthier, salmon or chicken and vegitables and fruits,  and cut out extra salt.       Anxiety/Depression  Associated symptoms include: anxiety   Severity is described per patient : Mild  Patient reports they are taking medications as prescribed and they are not having side effects.    Patient reports she had previously had palpitations which is then followed by some intermittent pinching pain in her left anterior chest that can occur approximately every 12 seconds.  These symptoms have typically been decreased since starting metoprolol however there has been a little stress at home in the last couple of days.  She believes the stress full situation will work itself out    Patient Care Team:  Polly Martinez MD as PCP - General (Family Medicine)   Current Outpatient Medications on File Prior to Visit   Medication Sig    escitalopram (Lexapro) 10 MG tablet Take 1 tablet by mouth Daily. 1/2 tablet for 1st 2 days (up to 1 week)  then increase to 1 daily    hydrOXYzine (ATARAX) 25 MG tablet Take 1 tablet by mouth 3 (Three) Times a Day As Needed for Anxiety. For panic attacks    metoprolol succinate XL (TOPROL-XL) 50 MG 24 hr tablet Take 1 tablet by  "mouth Daily.     No current facility-administered medications on file prior to visit.       Objective   Vital Signs:   /78 (BP Location: Right arm, Patient Position: Sitting, Cuff Size: Large Adult)   Pulse 101   Temp 98 °F (36.7 °C) (Temporal)   Resp 18   Ht 170.2 cm (67\")   Wt 91.2 kg (201 lb)   SpO2 97%   BMI 31.48 kg/m²    BP Readings from Last 3 Encounters:   05/17/24 132/78   04/15/24 138/82   04/09/24 142/94     Wt Readings from Last 3 Encounters:   05/17/24 91.2 kg (201 lb) (98%, Z= 1.96)*   04/15/24 89.5 kg (197 lb 6.4 oz) (97%, Z= 1.91)*   04/09/24 91.2 kg (201 lb) (98%, Z= 1.96)*     * Growth percentiles are based on [place] (Girls, 2-20 Years) data.         Physical Exam  Vitals and nursing note reviewed.   Constitutional:       General: She is not in acute distress.     Appearance: She is well-developed.   HENT:      Head: Normocephalic and atraumatic.   Neck:      Comments: Thyroid itself does not feel enlarged no palpable nodules or masses.  Subcutaneous tissues anterior to the thyroid are somewhat enlarged.  There is no tenderness.  Cardiovascular:      Rate and Rhythm: Normal rate and regular rhythm.      Heart sounds: No murmur heard.  Pulmonary:      Effort: Pulmonary effort is normal.      Breath sounds: Normal breath sounds. No wheezing.   Abdominal:      General: Bowel sounds are normal.      Tenderness: There is no abdominal tenderness.      Comments: Obese   Musculoskeletal:         General: Normal range of motion.      Cervical back: Normal range of motion and neck supple. No rigidity or tenderness.   Lymphadenopathy:      Cervical: No cervical adenopathy.   Skin:     General: Skin is warm and dry.      Findings: No rash.   Neurological:      Mental Status: She is alert and oriented to person, place, and time.   Psychiatric:         Mood and Affect: Mood normal.         Behavior: Behavior normal.         Thought Content: Thought content normal.            No visits with results " "within 1 Day(s) from this visit.   Latest known visit with results is:   Office Visit on 04/15/2024   Component Date Value Ref Range Status    Hemoglobin A1C 04/15/2024 5.9 (A)  4.5 - 5.7 % Final    Lot Number 04/15/2024 [id number]   Final    Expiration Date 04/15/2024 01/31/2026   Final     A1C Last 3 Results          4/15/2024    14:17   HGBA1C Last 3 Results   Hemoglobin A1C 5.9      No results found for: \"CHOL\", \"CHLPL\", \"TRIG\", \"HDL\", \"LDL\", \"LDLDIRECT\"  Lab Results   Component Value Date    TSH 2.610 04/12/2024     Lab Results   Component Value Date    GLUCOSE 172 (H) 04/12/2024    BUN 11 04/12/2024    CREATININE 0.57 04/12/2024    BCR 19 04/12/2024    K 3.9 04/12/2024    CO2 21 04/12/2024    CALCIUM 9.8 04/12/2024    PROTENTOTREF 7.8 04/12/2024    ALBUMIN 4.8 04/12/2024    LABIL2 1.6 04/12/2024    AST 79 (H) 04/12/2024     (H) 04/12/2024     Lab Results   Component Value Date    WBC 8.1 04/12/2024    HGB 14.5 04/12/2024    HCT 43.6 04/12/2024    MCV 85 04/12/2024     04/12/2024                      Assessment and Plan    Diagnoses and all orders for this visit:    1. Hypertension, unspecified type (Primary)    2. Anxiety    3. Class 1 obesity with body mass index (BMI) of 31.0 to 31.9 in adult, unspecified obesity type, unspecified whether serious comorbidity present    4. Amenorrhea  -     Ambulatory Referral to Obstetrics / Gynecology    5. PCB (post coital bleeding)  -     Ambulatory Referral to Obstetrics / Gynecology    6. Hirsutism    7. Acanthosis nigricans  -     Ambulatory Referral to Obstetrics / Gynecology    8. Palpitations        Hypertension and palpitations are significantly improved with addition and increase of metoprolol, currently at 50 mg daily, continue current dose.  Advised if she has more more than rare occasion sitting readings above 140/90 should increase medication further.    Anxiety, improved on Lexapro, continue    Patient has previously been referred to Dr. Harman, " "endocrinology, for amenorrhea, acanthosis nigricans, prediabetes.  Initial appointment is scheduled next week.  Patient has no symptoms\" no bleeding.  Previous pelvic ultrasound did not show evidence of ovarian cysts.  Patient is requesting referral to gynecology.  Referral has been placed.    There are no discontinued medications.      Follow Up     Return in about 3 months (around 8/17/2024) for Recheck, htn and anxiety.    Patient was given instructions and counseling regarding her condition or for health maintenance advice. Please see specific information pulled into the AVS if appropriate.   "

## 2024-05-17 ENCOUNTER — OFFICE VISIT (OUTPATIENT)
Dept: FAMILY MEDICINE CLINIC | Facility: CLINIC | Age: 19
End: 2024-05-17
Payer: COMMERCIAL

## 2024-05-17 VITALS
SYSTOLIC BLOOD PRESSURE: 132 MMHG | OXYGEN SATURATION: 97 % | HEART RATE: 101 BPM | BODY MASS INDEX: 31.55 KG/M2 | WEIGHT: 201 LBS | TEMPERATURE: 98 F | HEIGHT: 67 IN | DIASTOLIC BLOOD PRESSURE: 78 MMHG | RESPIRATION RATE: 18 BRPM

## 2024-05-17 DIAGNOSIS — N91.2 AMENORRHEA: ICD-10-CM

## 2024-05-17 DIAGNOSIS — L83 ACANTHOSIS NIGRICANS: ICD-10-CM

## 2024-05-17 DIAGNOSIS — L68.0 HIRSUTISM: ICD-10-CM

## 2024-05-17 DIAGNOSIS — E66.9 CLASS 1 OBESITY WITH BODY MASS INDEX (BMI) OF 31.0 TO 31.9 IN ADULT, UNSPECIFIED OBESITY TYPE, UNSPECIFIED WHETHER SERIOUS COMORBIDITY PRESENT: ICD-10-CM

## 2024-05-17 DIAGNOSIS — F41.9 ANXIETY: ICD-10-CM

## 2024-05-17 DIAGNOSIS — R00.2 PALPITATIONS: ICD-10-CM

## 2024-05-17 DIAGNOSIS — N93.0 PCB (POST COITAL BLEEDING): ICD-10-CM

## 2024-05-17 DIAGNOSIS — I10 HYPERTENSION, UNSPECIFIED TYPE: Primary | ICD-10-CM

## 2024-05-17 PROCEDURE — 99214 OFFICE O/P EST MOD 30 MIN: CPT | Performed by: FAMILY MEDICINE

## 2024-05-24 ENCOUNTER — OFFICE VISIT (OUTPATIENT)
Dept: ENDOCRINOLOGY | Facility: CLINIC | Age: 19
End: 2024-05-24
Payer: COMMERCIAL

## 2024-05-24 VITALS
OXYGEN SATURATION: 98 % | HEART RATE: 101 BPM | DIASTOLIC BLOOD PRESSURE: 68 MMHG | HEIGHT: 67 IN | BODY MASS INDEX: 31.39 KG/M2 | WEIGHT: 200 LBS | SYSTOLIC BLOOD PRESSURE: 132 MMHG

## 2024-05-24 DIAGNOSIS — E25.9 VIRILIZATION: ICD-10-CM

## 2024-05-24 DIAGNOSIS — N91.2 AMENORRHEA: ICD-10-CM

## 2024-05-24 DIAGNOSIS — R73.03 PREDIABETES: Primary | ICD-10-CM

## 2024-05-24 DIAGNOSIS — E88.819 INSULIN RESISTANCE: ICD-10-CM

## 2024-05-24 RX ORDER — METFORMIN HYDROCHLORIDE 500 MG/1
1000 TABLET, EXTENDED RELEASE ORAL
Qty: 60 TABLET | Refills: 2 | Status: SHIPPED | OUTPATIENT
Start: 2024-05-24

## 2024-05-24 NOTE — PATIENT INSTRUCTIONS
Please,    - Get fasting blood work done no later than 8:30 AM after at least 6 hours of good sleep.  - Start metformin therapy:  500 mg 1 pill by mouth daily for 1 week  After 1 week if you are tolerating medication without any side effect increase to 2 pills of metformin 500 mg daily    - Please schedule visit with GYN as planned on July 1, 2024    Thank you for your visit today.    If you have any questions or concerns please feel free to reach out of the office.

## 2024-05-24 NOTE — PROGRESS NOTES
-----------------------------------------------------------------  ENDOCRINE CLINIC NOTE  -----------------------------------------------------------------        PATIENT NAME: Bobo Odom  PATIENT : 2005 AGE: 18 y.o.  MRN NUMBER: [id number]  PRIMARY CARE: Polly Martinez MD    ==========================================================================    CHIEF COMPLAINT: Prediabetes  DATE OF SERVICE: 24    ==========================================================================    HPI / SUBJECTIVE    18 y.o. female  is seen in the clinic today for prediabetes.  Patient recently had blood work done on 4/15/2024 and showed A1c of 5.9%.  A1c was checked because patient had CMP done on 2024 which showed random BG post meal of 172.  Patient have family hx of diabetes, father.  Menarche at age 12 and reports to have amenorrhea for last 2.5 yrs. She is scheduled to see OB/GYN in 2024.  Was on the birth control for almost one year and since she stopped having OCP she have no cycles.  Reports excessive hairs.    ==========================================================================                                                PAST MEDICAL HISTORY    Past Medical History:   Diagnosis Date    Anxiety     Hypertension        ==========================================================================    PAST SURGICAL HISTORY    Past Surgical History:   Procedure Laterality Date    APPENDECTOMY  2024    TONSILLECTOMY  2023       ==========================================================================    FAMILY HISTORY    Family History   Problem Relation Age of Onset    Hypertension Mother     Diabetes Father     Hypertension Sister        ==========================================================================    SOCIAL HISTORY    Social History     Socioeconomic History    Marital status: Single   Tobacco Use    Smoking status: Never    Smokeless tobacco: Never    Vaping Use    Vaping status: Every Day    Substances: Nicotine    Devices: Disposable   Substance and Sexual Activity    Alcohol use: Never    Drug use: Never    Sexual activity: Yes     Partners: Male       ==========================================================================    MEDICATIONS      Current Outpatient Medications:     escitalopram (Lexapro) 10 MG tablet, Take 1 tablet by mouth Daily. 1/2 tablet for 1st 2 days (up to 1 week)  then increase to 1 daily, Disp: 30 tablet, Rfl: 0    hydrOXYzine (ATARAX) 25 MG tablet, Take 1 tablet by mouth 3 (Three) Times a Day As Needed for Anxiety. For panic attacks, Disp: 20 tablet, Rfl: 0    metoprolol succinate XL (TOPROL-XL) 50 MG 24 hr tablet, Take 1 tablet by mouth Daily., Disp: 30 tablet, Rfl: 0    metFORMIN ER (GLUCOPHAGE-XR) 500 MG 24 hr tablet, Take 2 tablets by mouth Daily With Breakfast., Disp: 60 tablet, Rfl: 2    ==========================================================================    ALLERGIES    No Known Allergies    ==========================================================================    OBJECTIVE    Vitals:    05/24/24 1319   BP: 132/68   Pulse: 101   SpO2: 98%     Body mass index is 31.32 kg/m².     General: Alert, cooperative, no acute distress  Thyroid:  no enlargement/tenderness/palpable nodules  Lungs: Clear to auscultation bilaterally, respirations unlabored  Heart: Regular rate and rhythm, S1 and S2 normal, no murmur, rub or gallop  Abdomen: Soft, NT, ND and Bowel sounds Positive  Extremities:  Extremities normal, atraumatic, no cyanosis or edema    ==========================================================================    LAB EVALUATION    Lab Results   Component Value Date    GLUCOSE 172 (H) 04/12/2024    BUN 11 04/12/2024    CREATININE 0.57 04/12/2024    BCR 19 04/12/2024    K 3.9 04/12/2024    CO2 21 04/12/2024    CALCIUM 9.8 04/12/2024    PROTENTOTREF 7.8 04/12/2024    ALBUMIN 4.8 04/12/2024    LABIL2 1.6 04/12/2024    AST  79 (H) 04/12/2024     (H) 04/12/2024     Lab Results   Component Value Date    HGBA1C 5.9 (A) 04/15/2024     Lab Results   Component Value Date    CREATININE 0.57 04/12/2024     Lab Results   Component Value Date    TSH 2.610 04/12/2024       ==========================================================================    ASSESSMENT AND PLAN    # Prediabetes/insulin resistance with A1c of 5.9%  # Virilization  # Secondary amenorrhea  - There is a possibility of PCOS  - Patient is already scheduled to see OB/GYN for evaluation of secondary amenorrhea but will start workup by evaluating hCG level followed with repeat FSH and LH along with estrogen level  - Blood work needs to be collected no later than 8:30 in the morning in a fasting state, this was counseled to patient in detail  - Depending on to the blood work results we will plan for further evaluation if needed  - Very unlikely this is atypical congenital adrenal hyperplasia but will also check 17-OH progesterone level  - Will start patient on metformin 500 mg daily for 1 week and then increase to 1000 mg daily  - Patient to reevaluate my clinic in 6 weeks time    Thank you for courtesy of consultation.    Return to clinic: 6 weeks time    Entire assessment and plan was discussed and counseled the patient in detail to which patient verbalized understanding and agreed with care.  Answered all queries and concerns.    Part of this note may be an electronic transcription/translation of spoken language to printed text using the Dragon Dictation System.     Note: Portions of this note may have been copied from previous notes but documentation have been reviewed and edited as necessary to support clinical decision making for today's visit.    ==========================================================================    INFORMATION PROVIDED TO PATIENT    Patient Instructions   Please,    - Get fasting blood work done no later than 8:30 AM after at least 6 hours of  good sleep.  - Start metformin therapy:  500 mg 1 pill by mouth daily for 1 week  After 1 week if you are tolerating medication without any side effect increase to 2 pills of metformin 500 mg daily    - Please schedule visit with GYN as planned on July 1, 2024    Thank you for your visit today.    If you have any questions or concerns please feel free to reach out of the office.       ==========================================================================  Ezekiel Harman MD  Department of Endocrine, Diabetes and Metabolism  [place], [place]  ==========================================================================

## 2024-05-29 ENCOUNTER — LAB (OUTPATIENT)
Dept: LAB | Facility: HOSPITAL | Age: 19
End: 2024-05-29
Payer: COMMERCIAL

## 2024-05-29 DIAGNOSIS — E88.819 INSULIN RESISTANCE: ICD-10-CM

## 2024-05-29 DIAGNOSIS — F41.0 PANIC ATTACKS: ICD-10-CM

## 2024-05-29 DIAGNOSIS — E25.9 VIRILIZATION: ICD-10-CM

## 2024-05-29 DIAGNOSIS — N91.2 AMENORRHEA: ICD-10-CM

## 2024-05-29 DIAGNOSIS — R73.03 PREDIABETES: ICD-10-CM

## 2024-05-29 LAB
FSH SERPL-ACNC: 2.17 MIU/ML
LH SERPL-ACNC: 6.35 MIU/ML

## 2024-05-29 PROCEDURE — 84702 CHORIONIC GONADOTROPIN TEST: CPT

## 2024-05-29 PROCEDURE — 36415 COLL VENOUS BLD VENIPUNCTURE: CPT

## 2024-05-29 PROCEDURE — 83001 ASSAY OF GONADOTROPIN (FSH): CPT

## 2024-05-29 PROCEDURE — 83498 ASY HYDROXYPROGESTERONE 17-D: CPT

## 2024-05-29 PROCEDURE — 83002 ASSAY OF GONADOTROPIN (LH): CPT

## 2024-05-29 PROCEDURE — 82672 ASSAY OF ESTROGEN: CPT

## 2024-05-29 RX ORDER — ESCITALOPRAM OXALATE 10 MG/1
10 TABLET ORAL DAILY
Qty: 30 TABLET | Refills: 0 | Status: SHIPPED | OUTPATIENT
Start: 2024-05-29

## 2024-05-30 LAB — HCG INTACT+B SERPL-ACNC: <1 MIU/ML

## 2024-06-02 DIAGNOSIS — F41.0 PANIC ATTACKS: ICD-10-CM

## 2024-06-02 LAB — 17OHP SERPL-MCNC: 48 NG/DL

## 2024-06-03 LAB — ESTROGEN SERPL-MCNC: 159 PG/ML

## 2024-06-03 RX ORDER — ESCITALOPRAM OXALATE 10 MG/1
10 TABLET ORAL DAILY
Qty: 30 TABLET | Refills: 0 | OUTPATIENT
Start: 2024-06-03

## 2024-06-03 NOTE — PROGRESS NOTES
"Chief Complaint  Medication concern and Anxiety    History of Present Illness  Bobo Odom presents today for follow up on anxiety and new onset concern of endocrinology appointment      Anxiety/Depression  Associated symptoms include: anxiety   Severity is described per patient : Mild  Patient reports they are taking medications as prescribed and they are not having side effects.    Patient was referred to endocrinology and saw Dr. Harman on 05/24/2024. She was prescribed Metformin, however she has not started the medication due to concerns as to whether it is necessary.  Patient states that she was told by Dr. Harman that he was unsure as to why she was referred. He is aware of her recent lab results as well as it is noted from the office visit on 05/24/2024.  Do have follow-up scheduled on July 15.    Have reviewed Dr. Harman's notes, assessment and plan as below:  \"# Prediabetes/insulin resistance with A1c of 5.9%  # Virilization  # Secondary amenorrhea  - There is a possibility of PCOS  - Patient is already scheduled to see OB/GYN for evaluation of secondary amenorrhea but will start workup by evaluating hCG level followed with repeat FSH and LH along with estrogen level  - Blood work needs to be collected no later than 8:30 in the morning in a fasting state, this was counseled to patient in detail  - Depending on to the blood work results we will plan for further evaluation if needed  - Very unlikely this is atypical congenital adrenal hyperplasia but will also check 17-OH progesterone level  - Will start patient on metformin 500 mg daily for 1 week and then increase to 1000 mg daily  - Patient to reevaluate my clinic in 6 weeks time\"       Patient is also scheduled to see Dr. Kelly,  OB/GYN on 07/01/2024.      Patient Care Team:  Polly Martinez MD as PCP - General (Family Medicine)   Current Outpatient Medications on File Prior to Visit   Medication Sig    hydrOXYzine (ATARAX) 25 MG tablet Take 1 " "tablet by mouth 3 (Three) Times a Day As Needed for Anxiety. For panic attacks    metoprolol succinate XL (TOPROL-XL) 50 MG 24 hr tablet Take 1 tablet by mouth Daily.    [DISCONTINUED] escitalopram (Lexapro) 10 MG tablet Take 1 tablet by mouth Daily. 1/2 tablet for 1st 2 days (up to 1 week)  then increase to 1 daily    metFORMIN ER (GLUCOPHAGE-XR) 500 MG 24 hr tablet Take 2 tablets by mouth Daily With Breakfast. (Patient not taking: Reported on 6/4/2024)     No current facility-administered medications on file prior to visit.       Objective   Vital Signs:   /74 (BP Location: Right arm, Patient Position: Sitting, Cuff Size: Large Adult)   Pulse 105   Temp 97.3 °F (36.3 °C) (Temporal)   Resp 18   Ht 170.2 cm (67\")   Wt 92.7 kg (204 lb 6.4 oz)   SpO2 97%   BMI 32.01 kg/m²    BP Readings from Last 3 Encounters:   06/04/24 128/74   05/24/24 132/68   05/17/24 132/78     Wt Readings from Last 3 Encounters:   06/04/24 92.7 kg (204 lb 6.4 oz) (98%, Z= 2.01)*   05/24/24 90.7 kg (200 lb) (97%, Z= 1.95)*   05/17/24 91.2 kg (201 lb) (98%, Z= 1.96)*     * Growth percentiles are based on CDC (Girls, 2-20 Years) data.         Physical Exam       No visits with results within 1 Day(s) from this visit.   Latest known visit with results is:   Lab on 05/29/2024   Component Date Value Ref Range Status    FSH 05/29/2024 2.17  mIU/mL Final    LH 05/29/2024 6.35  mIU/mL Final    Estrogen 05/29/2024 159  pg/mL Final                             Prepubertal             < 40                           Female Cycle:                             1-10 Days         16 - 328                             11-20 Days        34 - 501                             21-30 Days        48 - 350                             Post-Menopausal   40 - 244    HCG Quantitative 05/29/2024 <1  mIU/mL Final    Comment:                      Female (Non-pregnant)    0 -     5                              (Postmenopausal)  0 -     8                       Female " "(Pregnant)                       Weeks of Gestation                               3                6 -    71                               4               10 -   750                               5              508 - 0072                               6              694 - 37821                               7             [id number] -[id number]                               8            [id number] -[id number]                               9            24958 -[id number]                              10            [id number] -[id number]                                          [id number] -[id number]                              14            77542 - 46097                              15            76171 - 94907                              16             0926 - 48748                              17             0889 - 42064                              18             4530 - 44128  Roche                            ECLIA methodology    17-OH Progesterone LCMS 05/29/2024 48  ng/dL Final                              Adult Female                             Follicular        15 -  70                             Luteal            35 - 290     A1C Last 3 Results          4/15/2024    14:17   HGBA1C Last 3 Results   Hemoglobin A1C 5.9      No results found for: \"CHOL\", \"CHLPL\", \"TRIG\", \"HDL\", \"LDL\", \"LDLDIRECT\"  Lab Results   Component Value Date    TSH 2.610 04/12/2024     Lab Results   Component Value Date    GLUCOSE 172 (H) 04/12/2024    BUN 11 04/12/2024    CREATININE 0.57 04/12/2024    BCR 19 04/12/2024    K 3.9 04/12/2024    CO2 21 04/12/2024    CALCIUM 9.8 04/12/2024    PROTENTOTREF 7.8 04/12/2024    ALBUMIN 4.8 04/12/2024    LABIL2 1.6 04/12/2024    AST 79 (H) 04/12/2024     (H) 04/12/2024     Lab Results   Component Value Date    WBC 8.1 04/12/2024    HGB 14.5 04/12/2024    HCT 43.6 04/12/2024    MCV 85 04/12/2024     04/12/2024                      Assessment and Plan    Diagnoses and all orders for this visit:    1. Anxiety (Primary)    2. " Class 1 obesity with body mass index (BMI) of 32.0 to 32.9 in adult, unspecified obesity type, unspecified whether serious comorbidity present    3. Panic attacks  -     escitalopram (Lexapro) 10 MG tablet; Take 1 tablet by mouth Daily. 1/2 tablet for 1st 2 days (up to 1 week)  then increase to 1 daily  Dispense: 30 tablet; Refill: 5    4. Pre-diabetes  -     Hemoglobin A1c  -     glucose monitor monitoring kit; Use once daily to check glucose level dx E88.819 and R 73.03 brand of insurance preference  Dispense: 1 each; Refill: 0  -     glucose blood test strip; Use once daily to check glucose level dx E88.819 and R 73.03 brand of insurance preference  Dispense: 100 each; Refill: 3  -     Lancets Misc. kit; Use once daily to check glucose level dx E88.819 and R 73.03 brand of insurance preference  Dispense: 1 each; Refill: 0  -     Lancets misc; Use once daily to check glucose level dx E88.819 and R 73.03 brand of insurance preference  Dispense: 100 each; Refill: 3    5. Insulin resistance  -     Hemoglobin A1c  -     Testosterone  -     glucose monitor monitoring kit; Use once daily to check glucose level dx E88.819 and R 73.03 brand of insurance preference  Dispense: 1 each; Refill: 0  -     glucose blood test strip; Use once daily to check glucose level dx E88.819 and R 73.03 brand of insurance preference  Dispense: 100 each; Refill: 3  -     Lancets Misc. kit; Use once daily to check glucose level dx E88.819 and R 73.03 brand of insurance preference  Dispense: 1 each; Refill: 0  -     Lancets misc; Use once daily to check glucose level dx E88.819 and R 73.03 brand of insurance preference  Dispense: 100 each; Refill: 3    6. Amenorrhea  -     Testosterone    7. Screening for cholesterol level  -     Lipid Panel    8. Hirsutism  -     Testosterone      Patient has been having amenorrhea for the past couple of years, she has been having some spotting as it CHIRP.  She will try to start again, possibly due to recent  improvement in dietary habits..  She has been sexually active without contraception and has not achieved pregnancy in the past couple of years.  She has recently seen Dr. Harman, endocrinologist who did recommend starting metformin prior to upcoming appointment with gynecology.  I advised I agree with the metformin but did caution that this may increase fertility and that she should use condoms on a regular basis until she is actively attempting conception.  She does desire return of.  In anticipation of future pregnancy.  Advised hypoglycemia is unlikely however do recommend she be able to check sugars, prescriptions for test strips, glucometer and lancing device, and lancets so she can check blood sugars 2 ensure she is not having low sugars.  Did recommend she follow a low concentrated sweets diet and increasing physical activity.     Previous testosterone was free testosterone only, rechecking a total testosterone level.  She is fasting we will check lipid panel.  A1c was point-of-care testing which has proven to be not as accurate.  Will recheck A1c and lab.    She will keep appointment as scheduled with gynecology in July.    Medications Discontinued During This Encounter   Medication Reason    escitalopram (Lexapro) 10 MG tablet Reorder         Follow Up     Return in about 5 weeks (around 7/8/2024) for  recheck BP, and hromone issues/metformen and amenorrhea.    Patient was given instructions and counseling regarding her condition or for health maintenance advice. Please see specific information pulled into the AVS if appropriate.

## 2024-06-04 ENCOUNTER — OFFICE VISIT (OUTPATIENT)
Dept: FAMILY MEDICINE CLINIC | Facility: CLINIC | Age: 19
End: 2024-06-04
Payer: COMMERCIAL

## 2024-06-04 VITALS
DIASTOLIC BLOOD PRESSURE: 74 MMHG | HEART RATE: 105 BPM | TEMPERATURE: 97.3 F | HEIGHT: 67 IN | WEIGHT: 204.4 LBS | OXYGEN SATURATION: 97 % | BODY MASS INDEX: 32.08 KG/M2 | RESPIRATION RATE: 18 BRPM | SYSTOLIC BLOOD PRESSURE: 128 MMHG

## 2024-06-04 DIAGNOSIS — E88.819 INSULIN RESISTANCE: ICD-10-CM

## 2024-06-04 DIAGNOSIS — F41.9 ANXIETY: Primary | ICD-10-CM

## 2024-06-04 DIAGNOSIS — R73.03 PRE-DIABETES: ICD-10-CM

## 2024-06-04 DIAGNOSIS — F41.0 PANIC ATTACKS: ICD-10-CM

## 2024-06-04 DIAGNOSIS — N91.2 AMENORRHEA: ICD-10-CM

## 2024-06-04 DIAGNOSIS — L68.0 HIRSUTISM: ICD-10-CM

## 2024-06-04 DIAGNOSIS — Z13.220 SCREENING FOR CHOLESTEROL LEVEL: ICD-10-CM

## 2024-06-04 DIAGNOSIS — E66.9 CLASS 1 OBESITY WITH BODY MASS INDEX (BMI) OF 32.0 TO 32.9 IN ADULT, UNSPECIFIED OBESITY TYPE, UNSPECIFIED WHETHER SERIOUS COMORBIDITY PRESENT: ICD-10-CM

## 2024-06-04 PROCEDURE — 99214 OFFICE O/P EST MOD 30 MIN: CPT | Performed by: FAMILY MEDICINE

## 2024-06-04 RX ORDER — BLOOD-GLUCOSE METER
KIT MISCELLANEOUS
Qty: 1 EACH | Refills: 0 | Status: SHIPPED | OUTPATIENT
Start: 2024-06-04

## 2024-06-04 RX ORDER — ESCITALOPRAM OXALATE 10 MG/1
10 TABLET ORAL DAILY
Qty: 30 TABLET | Refills: 5 | Status: SHIPPED | OUTPATIENT
Start: 2024-06-04

## 2024-06-04 RX ORDER — LANCETS 30 GAUGE
EACH MISCELLANEOUS
Qty: 100 EACH | Refills: 3 | Status: SHIPPED | OUTPATIENT
Start: 2024-06-04

## 2024-06-05 LAB
CHOLEST SERPL-MCNC: 230 MG/DL (ref 100–169)
HBA1C MFR BLD: 6.8 % (ref 4.8–5.6)
HDLC SERPL-MCNC: 52 MG/DL
LDLC SERPL CALC-MCNC: 147 MG/DL (ref 0–109)
TESTOST SERPL-MCNC: 67 NG/DL (ref 13–71)
TRIGL SERPL-MCNC: 171 MG/DL (ref 0–89)
VLDLC SERPL CALC-MCNC: 31 MG/DL (ref 5–40)

## 2024-06-07 DIAGNOSIS — I10 HYPERTENSION, UNSPECIFIED TYPE: ICD-10-CM

## 2024-06-07 RX ORDER — METOPROLOL SUCCINATE 50 MG/1
50 TABLET, EXTENDED RELEASE ORAL DAILY
Qty: 90 TABLET | Refills: 0 | Status: SHIPPED | OUTPATIENT
Start: 2024-06-07

## 2024-07-08 NOTE — PROGRESS NOTES
Chief Complaint  Hypertension and Hormones    History of Present Illness  Bobo Odom presents today for follow up on hypertension and hormones      Hypertension  Patient does check blood pressure at home.   Home readings range from 130-140's/90's, however, she believes her home wrist machine gives elevated readings.  She did not bring the cuff with her today to try to validate this.  Patient denies  blurred vision, chest pain, dyspnea, headache, neck aches, orthopnea, palpitations, paroxysmal nocturnal dyspnea, peripheral edema, pulsating in the ears, and tiredness/fatigue.   Patient reports they are taking medications as prescribed and they are not having side effects.    Prediabetes/insulin resistance:  Patient is currently taking metformin 500 mg twice daily, blood sugar readings were ranging from 124-136 fasting prior to the medication change.  Since starting metformin fasting sugars have been 100-120's.  She has an appointment with endocrinologist, Dr. Harman on 07/15/2024.  Have been exercising walking and yard and house work.     Patient has seen Dr Robertson and started on Progesterone 200 mg for the first 10 days of every month. Recommended increasing to metformin 500 mg 3 daily for PCOS but continuing at 2 daily. Did repeat testosterone was 72 on 6/29/2024    Working at [employer], at the cafe, transitioning to  position.     Would like to increase lexapro due to continued anxiety. Heart beats fast and feel jittery     Patient Care Team:  Polly Martinez MD as PCP - General (Family Medicine)   Current Outpatient Medications on File Prior to Visit   Medication Sig    glucose blood test strip Use once daily to check glucose level dx E88.819 and R 73.03 brand of insurance preference    glucose monitor monitoring kit Use once daily to check glucose level dx E88.819 and R 73.03 brand of insurance preference    hydrOXYzine (ATARAX) 25 MG tablet Take 1 tablet by mouth 3 (Three) Times a Day As Needed  "for Anxiety. For panic attacks    Lancets Misc. kit Use once daily to check glucose level dx E88.819 and R 73.03 brand of insurance preference    Lancets misc Use once daily to check glucose level dx E88.819 and R 73.03 brand of insurance preference    metFORMIN ER (GLUCOPHAGE-XR) 500 MG 24 hr tablet Take 2 tablets by mouth Daily With Breakfast.    metoprolol succinate XL (TOPROL-XL) 50 MG 24 hr tablet Take 1 tablet by mouth Daily.    Progesterone (PROMETRIUM) 200 MG capsule ONE CAPSULE ORALLY ONCE A DAY AT BEDTIME ON THE FIRST THROUGH TENTH OF EACH MONTH 10 DAYS     No current facility-administered medications on file prior to visit.       Objective   Vital Signs:   /70 (BP Location: Right arm, Patient Position: Sitting, Cuff Size: Large Adult)   Pulse 110   Temp 96.6 °F (35.9 °C) (Temporal)   Resp 18   Ht 170.2 cm (67\")   Wt 91 kg (200 lb 9.6 oz)   SpO2 98%   BMI 31.42 kg/m²    BP Readings from Last 3 Encounters:   07/09/24 122/70   06/04/24 128/74   05/24/24 132/68     Wt Readings from Last 3 Encounters:   07/09/24 91 kg (200 lb 9.6 oz) (97%, Z= 1.95)*   06/04/24 92.7 kg (204 lb 6.4 oz) (98%, Z= 2.01)*   05/24/24 90.7 kg (200 lb) (97%, Z= 1.95)*     * Growth percentiles are based on CDC (Girls, 2-20 Years) data.         Physical Exam  Vitals and nursing note reviewed.   Constitutional:       General: She is not in acute distress.     Appearance: Normal appearance. She is well-developed. She is obese. She is not ill-appearing.   HENT:      Head: Normocephalic and atraumatic.   Cardiovascular:      Rate and Rhythm: Normal rate and regular rhythm.      Heart sounds: No murmur heard.  Pulmonary:      Effort: Pulmonary effort is normal.      Breath sounds: Normal breath sounds. No wheezing.   Abdominal:      General: Bowel sounds are normal.      Tenderness: There is no abdominal tenderness.      Comments: Obese   Musculoskeletal:         General: Normal range of motion.   Skin:     General: Skin is warm " and dry.      Findings: No rash.   Neurological:      Mental Status: She is alert and oriented to person, place, and time.   Psychiatric:         Mood and Affect: Mood normal.         Behavior: Behavior normal.         Thought Content: Thought content normal.            No visits with results within 1 Day(s) from this visit.   Latest known visit with results is:   Office Visit on 06/04/2024   Component Date Value Ref Range Status    Hemoglobin A1C 06/04/2024 6.8 (H)  4.8 - 5.6 % Final    Comment:          Prediabetes: 5.7 - 6.4           Diabetes: >6.4           Glycemic control for adults with diabetes: <7.0      Total Cholesterol 06/04/2024 230 (H)  100 - 169 mg/dL Final    Triglycerides 06/04/2024 171 (H)  0 - 89 mg/dL Final    HDL Cholesterol 06/04/2024 52  >39 mg/dL Final    VLDL Cholesterol Rafael 06/04/2024 31  5 - 40 mg/dL Final    LDL Chol Calc (NIH) 06/04/2024 147 (H)  0 - 109 mg/dL Final    Testosterone, Total 06/04/2024 67  13 - 71 ng/dL Final     A1C Last 3 Results          4/15/2024    14:17 6/4/2024    11:58   HGBA1C Last 3 Results   Hemoglobin A1C 5.9  6.8      Lab Results   Component Value Date    CHLPL 230 (H) 06/04/2024    TRIG 171 (H) 06/04/2024    HDL 52 06/04/2024     (H) 06/04/2024     Lab Results   Component Value Date    TSH 2.610 04/12/2024     Lab Results   Component Value Date    GLUCOSE 172 (H) 04/12/2024    BUN 11 04/12/2024    CREATININE 0.57 04/12/2024    BCR 19 04/12/2024    K 3.9 04/12/2024    CO2 21 04/12/2024    CALCIUM 9.8 04/12/2024    PROTENTOTREF 7.8 04/12/2024    ALBUMIN 4.8 04/12/2024    LABIL2 1.6 04/12/2024    AST 79 (H) 04/12/2024     (H) 04/12/2024     Lab Results   Component Value Date    WBC 8.1 04/12/2024    HGB 14.5 04/12/2024    HCT 43.6 04/12/2024    MCV 85 04/12/2024     04/12/2024                      Assessment and Plan    Diagnoses and all orders for this visit:    1. Hypertension, unspecified type (Primary)    2. Pre-diabetes    3. Class 1  obesity with body mass index (BMI) of 31.0 to 31.9 in adult, unspecified obesity type, unspecified whether serious comorbidity present    4. Current nicotine use    5. Panic attacks  -     escitalopram (Lexapro) 10 MG tablet; Take 1.5 tablets by mouth Daily.  Dispense: 45 tablet; Refill: 5    6. PCOS (polycystic ovarian syndrome)      Hypertension, blood pressure good in office today.  Some high readings at home.  Asked her to bring in her home cuff to compare with manual reading in the office to determine if it is reading accurately.    Prediabetes and PCOS, patient is doing well with metformin 1000 mg daily but Gyn recommended increase to 1500, advised to increase as recommended and confirm this is ok with Dr Harman at  follow-up appt with next week.    Obesity, patient is down 4 pounds in the past month.  Do recommend continued increase physical activity and calorie restricted diet for continued efforts at weight reduction.    Anxiety/panic attacks improved but not resolved with Lexapro increasing from 10 to 15 mg daily.      Medications Discontinued During This Encounter   Medication Reason    escitalopram (Lexapro) 10 MG tablet          Follow Up     Return in about 4 weeks (around 8/6/2024) for Annual physical, HTN and anxiety, Annual physical.    Patient was given instructions and counseling regarding her condition or for health maintenance advice. Please see specific information pulled into the AVS if appropriate.

## 2024-07-09 ENCOUNTER — OFFICE VISIT (OUTPATIENT)
Dept: FAMILY MEDICINE CLINIC | Facility: CLINIC | Age: 19
End: 2024-07-09
Payer: COMMERCIAL

## 2024-07-09 VITALS
BODY MASS INDEX: 31.48 KG/M2 | OXYGEN SATURATION: 98 % | TEMPERATURE: 96.6 F | SYSTOLIC BLOOD PRESSURE: 122 MMHG | HEIGHT: 67 IN | RESPIRATION RATE: 18 BRPM | DIASTOLIC BLOOD PRESSURE: 70 MMHG | WEIGHT: 200.6 LBS | HEART RATE: 110 BPM

## 2024-07-09 DIAGNOSIS — E28.2 PCOS (POLYCYSTIC OVARIAN SYNDROME): ICD-10-CM

## 2024-07-09 DIAGNOSIS — Z72.0 CURRENT NICOTINE USE: ICD-10-CM

## 2024-07-09 DIAGNOSIS — R73.03 PRE-DIABETES: ICD-10-CM

## 2024-07-09 DIAGNOSIS — I10 HYPERTENSION, UNSPECIFIED TYPE: Primary | ICD-10-CM

## 2024-07-09 DIAGNOSIS — E66.9 CLASS 1 OBESITY WITH BODY MASS INDEX (BMI) OF 31.0 TO 31.9 IN ADULT, UNSPECIFIED OBESITY TYPE, UNSPECIFIED WHETHER SERIOUS COMORBIDITY PRESENT: ICD-10-CM

## 2024-07-09 DIAGNOSIS — F41.0 PANIC ATTACKS: ICD-10-CM

## 2024-07-09 PROCEDURE — 99214 OFFICE O/P EST MOD 30 MIN: CPT | Performed by: FAMILY MEDICINE

## 2024-07-09 RX ORDER — PROGESTERONE 200 MG/1
CAPSULE ORAL
COMMUNITY
Start: 2024-06-26

## 2024-07-09 RX ORDER — ESCITALOPRAM OXALATE 10 MG/1
15 TABLET ORAL DAILY
Qty: 45 TABLET | Refills: 5 | Status: SHIPPED | OUTPATIENT
Start: 2024-07-09

## 2024-07-10 PROBLEM — E66.9 CLASS 1 OBESITY WITH BODY MASS INDEX (BMI) OF 31.0 TO 31.9 IN ADULT: Status: ACTIVE | Noted: 2024-07-10

## 2024-07-10 PROBLEM — E66.811 CLASS 1 OBESITY WITH BODY MASS INDEX (BMI) OF 31.0 TO 31.9 IN ADULT: Status: ACTIVE | Noted: 2024-07-10

## 2024-07-10 PROBLEM — F41.0 PANIC ATTACKS: Status: ACTIVE | Noted: 2024-07-10

## 2024-07-10 PROBLEM — E28.2 PCOS (POLYCYSTIC OVARIAN SYNDROME): Status: ACTIVE | Noted: 2024-07-10

## 2024-07-30 DIAGNOSIS — R73.03 PREDIABETES: Primary | ICD-10-CM

## 2024-07-31 RX ORDER — PROGESTERONE 200 MG/1
CAPSULE ORAL
Qty: 20 CAPSULE | OUTPATIENT
Start: 2024-07-31

## 2024-07-31 RX ORDER — METFORMIN HYDROCHLORIDE 500 MG/1
1000 TABLET, EXTENDED RELEASE ORAL
Qty: 60 TABLET | Refills: 2 | OUTPATIENT
Start: 2024-07-31

## 2024-07-31 RX ORDER — METFORMIN HYDROCHLORIDE 500 MG/1
1000 TABLET, EXTENDED RELEASE ORAL
Qty: 180 TABLET | Refills: 2 | Status: SHIPPED | OUTPATIENT
Start: 2024-07-31

## 2024-08-26 NOTE — PROGRESS NOTES
Mateusz Odom is a 18 y.o. female.     Chief Complaint   Patient presents with    Annual Exam    Anxiety    Hypertension       The patient is here: to discuss health maintenance and disease prevention.  Last comprehensive physical was on unknown as patient has not had wellness with .   Overall has: moderate activity with work/home activities, good appetite, feels well with minor complaints, good energy level, and is sleeping well. Nutrition: eating a variety of foods. Last tetanus shot was  7/21/2016 .   Patient reports she is up to date on pap smear and chlamydia testing in June. She saw NANO Gonzales at 's office we have called for these records.     History of Present Illness     Anxiety/Depression  Associated symptoms include: depressed mood, fatigue, difficulty concentrating, impaired memory, and anxiety   Severity is described per patient : Moderate  Patient reports they are taking medications as prescribed and they are not having side effects.  She reports she is going through a breakup. She has had to move out and move back home to parents house.   Initially she states her anxiety is good but then she states she has not yet  increased Lexapro from 10 to 15 mg as intended and does think she needs to do this.    PHQ-9 Depression Screening  Little interest or pleasure in doing things? 0-->not at all   Feeling down, depressed, or hopeless? 0-->not at all   Trouble falling or staying asleep, or sleeping too much?     Feeling tired or having little energy?     Poor appetite or overeating?     Feeling bad about yourself - or that you are a failure or have let yourself or your family down?     Trouble concentrating on things, such as reading the newspaper or watching television?     Moving or speaking so slowly that other people could have noticed? Or the opposite - being so fidgety or restless that you have been moving around a lot more than usual?     Thoughts that you would be  better off dead, or of hurting yourself in some way?     PHQ-9 Total Score 0   If you checked off any problems, how difficult have these problems made it for you to do your work, take care of things at home, or get along with other people?         Hypertension  Patient does check blood pressure at home.   Home readings range from  120s/70s to 130s/80s per patient.  Patient denies  chest pain, headache, neck aches, and palpitations   Patient reports they are taking metoprolol as prescribed and they are not having side effects.    PCOS/prediabetes:  Patient is continuing to follow with endocrinology Dr. Harman.  She is taking metformin 500 mg 2 daily.  Inquiring if she can take these both at the same time advised yes both in the evening would be advisable.  Also states gynecology recommended 1500 mg daily for better control of PCOS but she has not yet increased.  She has not had return of menstrual periods despite metformin and cyclic hormones.    Continuing to have trouble staying awake, very tired.  When you come home from work will nap for 4 hours, then sleep 8-9 hours at night, if do not nap will sleep up to 11 hours.   Very active, do exercise.   Not taking atarax.    Vaping a little more, especially when refill anxious.  Will take 4 to 5 days to complete a pod, but when very busy may take her 2 weeks to complete a pod  Broke up with boyfriend.  Moving back in with mom    Recent Hospitalizations:  No hospitalization(s) within the last year..  ccc      I personally reviewed and updated the patient's allergies, medications, problem list, and past medical, surgical, social, and family history. I have reviewed and confirmed the accuracy of the HPI and ROS as documented by the MA/LPN/RN Polly Martinez MD    Family History   Problem Relation Age of Onset    Hypertension Mother     Diabetes Father     Hypertension Sister        Social History     Tobacco Use    Smoking status: Never     Passive exposure: Never     Smokeless tobacco: Never   Vaping Use    Vaping status: Every Day    Start date: 1/1/2019    Substances: Nicotine    Devices: Disposable    Passive vaping exposure: Yes   Substance Use Topics    Alcohol use: Never    Drug use: Never       Past Surgical History:   Procedure Laterality Date    APPENDECTOMY  03/06/2024    TONSILLECTOMY  11/2023       Patient Active Problem List   Diagnosis    Enlarged thyroid gland    Current nicotine use    BMI 30.0-30.9,adult    Hepatic steatosis    Hyperglycemia    Elevated ferritin level    Elevated LFTs    Anxiety    Pre-diabetes    PCOS (polycystic ovarian syndrome)    Panic attacks    Class 1 obesity with body mass index (BMI) of 31.0 to 31.9 in adult         Current Outpatient Medications:     escitalopram (Lexapro) 10 MG tablet, Take 1.5 tablets by mouth Daily., Disp: 135 tablet, Rfl: 3    glucose blood test strip, Use once daily to check glucose level dx E88.819 and R 73.03 brand of insurance preference, Disp: 100 each, Rfl: 3    glucose monitor monitoring kit, Use once daily to check glucose level dx E88.819 and R 73.03 brand of insurance preference, Disp: 1 each, Rfl: 0    Lancets Misc. kit, Use once daily to check glucose level dx E88.819 and R 73.03 brand of insurance preference, Disp: 1 each, Rfl: 0    Lancets misc, Use once daily to check glucose level dx E88.819 and R 73.03 brand of insurance preference, Disp: 100 each, Rfl: 3    metFORMIN ER (GLUCOPHAGE-XR) 500 MG 24 hr tablet, Take 2 tablets by mouth Daily With Breakfast., Disp: 180 tablet, Rfl: 2    metoprolol succinate XL (TOPROL-XL) 50 MG 24 hr tablet, Take 1 tablet by mouth Daily., Disp: 90 tablet, Rfl: 3    norethindrone (AYGESTIN) 5 MG tablet, TAKE 2 TABLETS BY MOUTH ONCE A DAY FOR 10 DAYS, Disp: , Rfl:     hydrOXYzine (ATARAX) 25 MG tablet, Take 1 tablet by mouth 3 (Three) Times a Day As Needed for Anxiety. For panic attacks, Disp: 20 tablet, Rfl: 0      Objective   /60 (BP Location: Right arm,  "Patient Position: Sitting, Cuff Size: Adult)   Pulse 100   Temp 98.4 °F (36.9 °C) (Temporal)   Resp 18   Ht 170.2 cm (67\")   Wt 90.4 kg (199 lb 6.4 oz)   SpO2 96%   BMI 31.23 kg/m²   BP Readings from Last 3 Encounters:   08/27/24 110/60   07/09/24 122/70   06/04/24 128/74     Wt Readings from Last 3 Encounters:   08/27/24 90.4 kg (199 lb 6.4 oz) (97%, Z= 1.93)*   07/09/24 91 kg (200 lb 9.6 oz) (97%, Z= 1.95)*   06/04/24 92.7 kg (204 lb 6.4 oz) (98%, Z= 2.01)*     * Growth percentiles are based on [facility] (Girls, 2-20 Years) data.     Physical Exam  Vitals and nursing note reviewed.   Constitutional:       General: She is not in acute distress.     Appearance: Normal appearance. She is well-developed. She is obese. She is not ill-appearing.   HENT:      Head: Normocephalic and atraumatic.   Cardiovascular:      Rate and Rhythm: Normal rate and regular rhythm.      Heart sounds: No murmur heard.  Pulmonary:      Effort: Pulmonary effort is normal.      Breath sounds: Normal breath sounds. No wheezing.   Musculoskeletal:         General: Normal range of motion.   Skin:     General: Skin is warm and dry.      Findings: No rash.   Neurological:      Mental Status: She is alert and oriented to person, place, and time.   Psychiatric:         Mood and Affect: Mood normal.         Behavior: Behavior normal.         Thought Content: Thought content normal.         Data / Lab Results:    Hemoglobin A1C   Date Value Ref Range Status   06/04/2024 6.8 (H) 4.8 - 5.6 % Final     Comment:              Prediabetes: 5.7 - 6.4           Diabetes: >6.4           Glycemic control for adults with diabetes: <7.0     04/15/2024 5.9 (A) 4.5 - 5.7 % Final        Lab Results   Component Value Date     (H) 06/04/2024     No results found for: \"CHOL\"  Lab Results   Component Value Date    TRIG 171 (H) 06/04/2024     Lab Results   Component Value Date    HDL 52 06/04/2024       Lab Results   Component Value Date    WBC 8.1 04/12/2024    " "HGB 14.5 04/12/2024    HCT 43.6 04/12/2024    MCV 85 04/12/2024     04/12/2024     Lab Results   Component Value Date    TSH 2.610 04/12/2024     Lab Results   Component Value Date    GLUCOSE 172 (H) 04/12/2024    BUN 11 04/12/2024    CREATININE 0.57 04/12/2024    BCR 19 04/12/2024    K 3.9 04/12/2024    CO2 21 04/12/2024    CALCIUM 9.8 04/12/2024    PROTENTOTREF 7.8 04/12/2024    ALBUMIN 4.8 04/12/2024    LABIL2 1.6 04/12/2024    AST 79 (H) 04/12/2024     (H) 04/12/2024     No results found for: \"MARGI\", \"RF\", \"SEDRATE\"   No results found for: \"CRP\"   Lab Results   Component Value Date    FERRITIN 224 (H) 04/12/2024      Age-appropriate Screening Schedule:  Refer to the list below for future screening recommendations based on patient's age, sex and/or medical conditions. Orders for these recommended tests are listed in the plan section. The patient has been provided with a written plan.    Health Maintenance   Topic Date Due    Pneumococcal Vaccine 0-64 (1 of 1 - PPSV23 or PCV20) 12/07/2010    CHLAMYDIA SCREENING  Never done    HPV VACCINES (2 - 3-dose series) 08/03/2023    INFLUENZA VACCINE  08/01/2024    ANNUAL PHYSICAL  08/27/2025    TDAP/TD VACCINES (3 - Td or Tdap) 07/11/2034    HEPATITIS C SCREENING  Completed    MENINGOCOCCAL VACCINE  Completed    COVID-19 Vaccine  Discontinued           Assessment & Plan      Medications        Problem List         LOS        Diagnoses and all orders for this visit:    1. Annual visit for general adult medical examination with abnormal findings (Primary)    2. Hypertension, unspecified type  -     metoprolol succinate XL (TOPROL-XL) 50 MG 24 hr tablet; Take 1 tablet by mouth Daily.  Dispense: 90 tablet; Refill: 3    3. Panic attacks  -     escitalopram (Lexapro) 10 MG tablet; Take 1.5 tablets by mouth Daily.  Dispense: 135 tablet; Refill: 3    4. Class 1 obesity due to excess calories without serious comorbidity with body mass index (BMI) of 31.0 to 31.9 in " adult    5. Current nicotine use      Pediatric BMI = 95 %ile (Z= 1.62) based on CDC (Girls, 2-20 Years) BMI-for-age based on BMI available as of 8/27/2024.. BMI is >= 30 and <35. (Class 1 Obesity). The following options were offered after discussion;: exercise counseling/recommendations and nutrition counseling/recommendations     The patient was counseled regarding nutrition, physical activity, healthy weight, injury prevention, immunizations and preventative health screenings.    Did have negative PPD at [facility] times 2.   Will get flu shot in October at [facility].   Delines HPV and Pneumonia shot at this time. Will consider getting [facility] in [place] with where her grandmother works.    Did encouraged to change to a nonnicotine containing vape once she is free of nicotine she can then work on the hand to mouth compartment of the habit of vaping.    Hypertension at goal continue metoprolol.    PCOS and prediabetes followed by endocrinology and gynecology did encourage increasing metformin 500 mg to 1 in the morning 2 in the evening as directed by gynecology.    Anxiety with persistent symptoms, do recommend increasing Lexapro from 10 to 15 mg as previously prescribed    Expected course, medications, and adverse effects discussed.  Call or return if worsening or persistent symptoms.  I wore protective equipment throughout this patient encounter including a mask, gloves, and eye protection.  Hand hygiene was performed before donning protective equipment and after removal when leaving the room. The complete contents of the Assessment and Plan and Data / Lab Results as documented above have been reviewed and addressed by myself with the patient today as part of an ongoing evaluation / treatment plan.  If some of the documentation has been copied from a previous note and is unchanged it indicates that this problem / plan has been assessed today but is stable from a previous visit and no changes have been  recommended.  The separate E/M service provided today is significant, medically necessary, and separately identifiable.

## 2024-08-27 ENCOUNTER — OFFICE VISIT (OUTPATIENT)
Dept: FAMILY MEDICINE CLINIC | Facility: CLINIC | Age: 19
End: 2024-08-27
Payer: COMMERCIAL

## 2024-08-27 VITALS
SYSTOLIC BLOOD PRESSURE: 110 MMHG | HEART RATE: 100 BPM | WEIGHT: 199.4 LBS | TEMPERATURE: 98.4 F | BODY MASS INDEX: 31.3 KG/M2 | RESPIRATION RATE: 18 BRPM | DIASTOLIC BLOOD PRESSURE: 60 MMHG | HEIGHT: 67 IN | OXYGEN SATURATION: 96 %

## 2024-08-27 DIAGNOSIS — Z00.01 ANNUAL VISIT FOR GENERAL ADULT MEDICAL EXAMINATION WITH ABNORMAL FINDINGS: Primary | ICD-10-CM

## 2024-08-27 DIAGNOSIS — I10 HYPERTENSION, UNSPECIFIED TYPE: ICD-10-CM

## 2024-08-27 DIAGNOSIS — Z72.0 CURRENT NICOTINE USE: ICD-10-CM

## 2024-08-27 DIAGNOSIS — F41.0 PANIC ATTACKS: ICD-10-CM

## 2024-08-27 DIAGNOSIS — E66.09 CLASS 1 OBESITY DUE TO EXCESS CALORIES WITHOUT SERIOUS COMORBIDITY WITH BODY MASS INDEX (BMI) OF 31.0 TO 31.9 IN ADULT: ICD-10-CM

## 2024-08-27 PROCEDURE — 99214 OFFICE O/P EST MOD 30 MIN: CPT | Performed by: FAMILY MEDICINE

## 2024-08-27 PROCEDURE — 99395 PREV VISIT EST AGE 18-39: CPT | Performed by: FAMILY MEDICINE

## 2024-08-27 RX ORDER — NORETHINDRONE ACETATE 5 MG
TABLET ORAL
COMMUNITY
Start: 2024-07-31

## 2024-08-27 RX ORDER — METOPROLOL SUCCINATE 50 MG/1
50 TABLET, EXTENDED RELEASE ORAL DAILY
Qty: 90 TABLET | Refills: 3 | Status: SHIPPED | OUTPATIENT
Start: 2024-08-27

## 2024-08-27 RX ORDER — ESCITALOPRAM OXALATE 10 MG/1
15 TABLET ORAL DAILY
Qty: 135 TABLET | Refills: 3 | Status: SHIPPED | OUTPATIENT
Start: 2024-08-27

## 2024-09-01 RX ORDER — NORETHINDRONE ACETATE 5 MG
TABLET ORAL
OUTPATIENT
Start: 2024-09-01

## 2024-09-04 RX ORDER — NORETHINDRONE ACETATE 5 MG
TABLET ORAL
OUTPATIENT
Start: 2024-09-04

## 2024-09-17 ENCOUNTER — OFFICE VISIT (OUTPATIENT)
Dept: ENDOCRINOLOGY | Facility: CLINIC | Age: 19
End: 2024-09-17
Payer: COMMERCIAL

## 2024-09-17 VITALS
SYSTOLIC BLOOD PRESSURE: 126 MMHG | OXYGEN SATURATION: 99 % | DIASTOLIC BLOOD PRESSURE: 74 MMHG | BODY MASS INDEX: 31.55 KG/M2 | HEART RATE: 87 BPM | HEIGHT: 67 IN | WEIGHT: 201 LBS

## 2024-09-17 DIAGNOSIS — E25.9 VIRILIZATION: ICD-10-CM

## 2024-09-17 DIAGNOSIS — E28.2 PCOS (POLYCYSTIC OVARIAN SYNDROME): ICD-10-CM

## 2024-09-17 DIAGNOSIS — N91.2 AMENORRHEA: ICD-10-CM

## 2024-09-17 DIAGNOSIS — E88.819 INSULIN RESISTANCE: ICD-10-CM

## 2024-09-17 DIAGNOSIS — E11.65 TYPE 2 DIABETES MELLITUS WITH HYPERGLYCEMIA, WITHOUT LONG-TERM CURRENT USE OF INSULIN: Primary | ICD-10-CM

## 2024-09-17 PROCEDURE — 99214 OFFICE O/P EST MOD 30 MIN: CPT | Performed by: INTERNAL MEDICINE

## 2024-09-17 RX ORDER — SEMAGLUTIDE 0.68 MG/ML
0.25 INJECTION, SOLUTION SUBCUTANEOUS WEEKLY
Start: 2024-09-17 | End: 2024-09-18 | Stop reason: SDUPTHER

## 2024-09-18 ENCOUNTER — SPECIALTY PHARMACY (OUTPATIENT)
Dept: ENDOCRINOLOGY | Facility: CLINIC | Age: 19
End: 2024-09-18
Payer: COMMERCIAL

## 2024-09-18 DIAGNOSIS — E11.65 TYPE 2 DIABETES MELLITUS WITH HYPERGLYCEMIA, WITHOUT LONG-TERM CURRENT USE OF INSULIN: ICD-10-CM

## 2024-09-18 RX ORDER — SEMAGLUTIDE 0.68 MG/ML
0.5 INJECTION, SOLUTION SUBCUTANEOUS WEEKLY
Qty: 9 ML | Refills: 1 | Status: SHIPPED | OUTPATIENT
Start: 2024-09-18

## 2024-09-20 ENCOUNTER — TELEPHONE (OUTPATIENT)
Dept: ENDOCRINOLOGY | Facility: CLINIC | Age: 19
End: 2024-09-20

## 2024-09-20 ENCOUNTER — TELEPHONE (OUTPATIENT)
Dept: ENDOCRINOLOGY | Facility: CLINIC | Age: 19
End: 2024-09-20
Payer: COMMERCIAL

## 2024-10-04 ENCOUNTER — TELEPHONE (OUTPATIENT)
Dept: ENDOCRINOLOGY | Facility: CLINIC | Age: 19
End: 2024-10-04
Payer: COMMERCIAL

## 2024-10-04 NOTE — TELEPHONE ENCOUNTER
Caller: Bobo Odom    Relationship to patient: Self    Best call back number: [phone]     Patient is needing: PT CALLING TO UPDATE CLINICAL TEAM: PT EXPERIENCING NEGATIVE SIDE EFFECTS OF OZEMPIC SUCH AS INTENSE FEELINGS OF VERTIGO/FAINT SHORTLY AFTER TAKING METFORMIN. METFORMIN DID NOT AFFECT PT IN THIS WAY BEFORE BEGINNING OZEMPIC. SYMPTOMS TYPICALLY LAST 2-3 MINUTES BEFORE DISSIPATING. PT CONCERNED THAT SHE MAY NEED TO HALT TREATMENT, NEXT DOSE DUE 10/4/24. PLEASE REVIEW AND ADVISE.

## 2024-10-08 NOTE — TELEPHONE ENCOUNTER
Tried calling patient and no answer. VM left to call back.    Valeria: Can you please reach out to patient and inform that if she feeling side-effects from Ozempic the stop using ozempic therapy, maintain good hydration around 4-6 glasses of water. Start checking her BG 2-3 times a week and maintain logs. Also stop metformin for few days and restart metformin once she is feeling better.    Ezekiel Harman MD  19:13 EDT  10/08/24

## 2024-10-08 NOTE — TELEPHONE ENCOUNTER
Pt called back to the answ service @ 7:40 pm.  I read to her what Dr. Harman said.  She is going to stop Ozempic & metfomin.  When she is feeling better in a few days, restart one metformin daily.  Keep bl sugar logs.

## 2024-10-10 DIAGNOSIS — R73.03 PRE-DIABETES: ICD-10-CM

## 2024-10-10 DIAGNOSIS — E88.819 INSULIN RESISTANCE: ICD-10-CM

## 2024-10-10 DIAGNOSIS — R73.03 PREDIABETES: ICD-10-CM

## 2024-10-11 RX ORDER — METFORMIN HCL 500 MG
1000 TABLET, EXTENDED RELEASE 24 HR ORAL
Qty: 180 TABLET | Refills: 2 | Status: SHIPPED | OUTPATIENT
Start: 2024-10-11

## 2024-10-17 ENCOUNTER — TELEPHONE (OUTPATIENT)
Dept: ENDOCRINOLOGY | Facility: CLINIC | Age: 19
End: 2024-10-17

## 2024-10-17 NOTE — TELEPHONE ENCOUNTER
Caller: Bobo Oodm    Relationship: Self    Best call back number: [phone]    What is the best time to reach you: AFTER 2:30    Who are you requesting to speak with (clinical staff, provider,  specific staff member): PROVIDER / CLINICAL     Do you know the name of the person who called: SELF     What was the call regarding: PATIENT WANTING TO KNOW IF IT IS OKAY SHE RESTART HER OZEMPIC SHOT     Is it okay if the provider responds through MyChart:

## 2025-02-06 NOTE — PROGRESS NOTES
Chief Complaint  Hypertension and Anxiety    History of Present Illness  Bobo Odom presents today for follow up on anxiety and hypertension    Anxiety/Depression  Associated symptoms include: anxiety   Severity is described per patient : Mild  Patient reports they are taking medications as prescribed and they are not having side effects.    Hypertension  Patient does check blood pressure at home.   Home readings range from 130's/90's per patient, she uses a wrist cuff.  Patient denies  blurred vision, chest pain, dyspnea, headache, neck aches, orthopnea, palpitations, paroxysmal nocturnal dyspnea, peripheral edema, pulsating in the ears, and tiredness/fatigue   Patient reports they are taking medications as prescribed and they are not having side effects.  History of Present Illness  The patient is a 19-year-old female who presents for a follow-up on anxiety, fatigue, and diabetes.    She reports that her anxiety is well-managed with Lexapro. However, she has been experiencing excessive sleepiness, which she is uncertain if it is a side effect of the medication or due to weather conditions. Over the past 3 days, she has been unable to stay awake for more than 2 hours after waking up, often falling asleep for an additional 5 to 6 hours. This pattern has disrupted her work schedule, causing her to miss shifts. She also reports instances of falling asleep during text conversations. Her grandmother has observed her sleeping with the lights on, and despite attempts to wake her after 30 minutes, she often sleeps until the next day. Her sleep schedule is further complicated by late-night awakenings around 10 or 11 PM, after which she struggles to return to sleep. She has previously tried melatonin for sleep regulation, and found it helpful. She has been missing meals due to her sleep schedule. She does not experience heavy menstrual periods and has a history of low blood count following an appendectomy, which took a  while to normalize. She has an upcoming appointment with her OB-GYN next month.    She is currently on metoprolol, Lexapro, metformin, and Ozempic. She monitors her blood sugar levels regularly, which have ranged from 100 to 113, with no readings below 80 or above 130 since starting Ozempic. She is under the care of Dr. Harman for endocrinology but missed her last appointment and has not yet rescheduled. She has been unable to reschedule this appointment due to difficulties reaching the office. She notes that her A1c was not checked at her last visit, leaving her uncertain about the necessity of continuing Ozempic. She has experienced weight loss with Ozempic, dropping from 201 pounds to 191 pounds, and maintains an active lifestyle with regular gym workouts. She tolerates Ozempic well, although she occasionally feels unwell the day after administration. She is currently on a 0.25 dose of Ozempic and has not had a recent dose adjustment.    MEDICATIONS  metoprolol, Lexapro, metformin, Ozempic      Patient Care Team:  Polly Martinez MD as PCP - General (Family Medicine)   Current Outpatient Medications on File Prior to Visit   Medication Sig    glucose blood test strip Use once daily to check glucose level dx E88.819 and R 73.03 brand of insurance preference    glucose monitor monitoring kit Use once daily to check glucose level dx E88.819 and R 73.03 brand of insurance preference    hydrOXYzine (ATARAX) 25 MG tablet Take 1 tablet by mouth 3 (Three) Times a Day As Needed for Anxiety. For panic attacks    Lancets Misc. kit Use once daily to check glucose level dx E88.819 and R 73.03 brand of insurance preference    Lancets misc Use once daily to check glucose level dx E88.819 and R 73.03 brand of insurance preference    metFORMIN ER (GLUCOPHAGE-XR) 500 MG 24 hr tablet Take 2 tablets by mouth Daily With Breakfast.    metoprolol succinate XL (TOPROL-XL) 50 MG 24 hr tablet Take 1 tablet by mouth Daily.     "Semaglutide,0.25 or 0.5MG/DOS, (Ozempic, 0.25 or 0.5 MG/DOSE,) 2 MG/3ML solution pen-injector Inject 0.5 mg under the skin into the appropriate area as directed 1 (One) Time Per Week. Ozempic 0.25mg weekly x 4 weeks then 0.5mg weekly thereafter    [DISCONTINUED] escitalopram (Lexapro) 10 MG tablet Take 1.5 tablets by mouth Daily.    norethindrone (AYGESTIN) 5 MG tablet TAKE 2 TABLETS BY MOUTH ONCE A DAY FOR 10 DAYS (Patient not taking: Reported on 2/7/2025)     No current facility-administered medications on file prior to visit.       Objective   Vital Signs:   /78 (BP Location: Right arm, Patient Position: Sitting, Cuff Size: Large Adult)   Pulse 104   Temp 97.5 °F (36.4 °C) (Temporal)   Resp 18   Ht 170.2 cm (67\")   Wt 86.9 kg (191 lb 9.6 oz)   SpO2 97%   BMI 30.01 kg/m²    BP Readings from Last 3 Encounters:   02/07/25 122/78   09/17/24 126/74   08/27/24 110/60     Wt Readings from Last 3 Encounters:   02/07/25 86.9 kg (191 lb 9.6 oz)   09/17/24 91.2 kg (201 lb) (97%, Z= 1.96)*   08/27/24 90.4 kg (199 lb 6.4 oz) (97%, Z= 1.93)*     * Growth percentiles are based on CDC (Girls, 2-20 Years) data.         Physical Exam  Vitals and nursing note reviewed.   Constitutional:       General: She is not in acute distress.     Appearance: Normal appearance. She is well-developed. She is obese. She is not ill-appearing.   HENT:      Head: Normocephalic and atraumatic.   Cardiovascular:      Rate and Rhythm: Normal rate and regular rhythm.      Heart sounds: No murmur heard.  Pulmonary:      Effort: Pulmonary effort is normal.      Breath sounds: Normal breath sounds. No wheezing.   Musculoskeletal:         General: Normal range of motion.   Skin:     General: Skin is warm and dry.      Findings: No rash.   Neurological:      Mental Status: She is alert and oriented to person, place, and time.   Psychiatric:         Mood and Affect: Mood normal.         Behavior: Behavior normal.         Thought Content: Thought " content normal.          Physical Exam  Lung sounds are clear.  Heart sounds are normal.      No visits with results within 1 Day(s) from this visit.   Latest known visit with results is:   Office Visit on 06/04/2024   Component Date Value Ref Range Status    Hemoglobin A1C 06/04/2024 6.8 (H)  4.8 - 5.6 % Final    Comment:          Prediabetes: 5.7 - 6.4           Diabetes: >6.4           Glycemic control for adults with diabetes: <7.0      Total Cholesterol 06/04/2024 230 (H)  100 - 169 mg/dL Final    Triglycerides 06/04/2024 171 (H)  0 - 89 mg/dL Final    HDL Cholesterol 06/04/2024 52  >39 mg/dL Final    VLDL Cholesterol Rafael 06/04/2024 31  5 - 40 mg/dL Final    LDL Chol Calc (NIH) 06/04/2024 147 (H)  0 - 109 mg/dL Final    Testosterone, Total 06/04/2024 67  13 - 71 ng/dL Final     A1C Last 3 Results          4/15/2024    14:17 6/4/2024    11:58   HGBA1C Last 3 Results   Hemoglobin A1C 5.9  6.8      Lab Results   Component Value Date    CHLPL 230 (H) 06/04/2024    TRIG 171 (H) 06/04/2024    HDL 52 06/04/2024     (H) 06/04/2024     Lab Results   Component Value Date    TSH 2.610 04/12/2024     Lab Results   Component Value Date    GLUCOSE 172 (H) 04/12/2024    BUN 11 04/12/2024    CREATININE 0.57 04/12/2024    BCR 19 04/12/2024    K 3.9 04/12/2024    CO2 21 04/12/2024    CALCIUM 9.8 04/12/2024    PROTENTOTREF 7.8 04/12/2024    ALBUMIN 4.8 04/12/2024    LABIL2 1.6 04/12/2024    AST 79 (H) 04/12/2024     (H) 04/12/2024     Lab Results   Component Value Date    WBC 8.1 04/12/2024    HGB 14.5 04/12/2024    HCT 43.6 04/12/2024    MCV 85 04/12/2024     04/12/2024             Results  Laboratory Studies  Blood sugar levels range from 100 to 113.             Assessment and Plan    Diagnoses and all orders for this visit:    1. Anxiety (Primary)    2. Panic attacks  -     escitalopram (Lexapro) 10 MG tablet; Take 1.5 tablets by mouth Daily.  Dispense: 135 tablet; Refill: 3    3. Hypertension, unspecified  type    4. BMI 30.0-30.9,adult    5. Current nicotine use    6. Other fatigue  -     CBC & Differential; Future  -     TSH Rfx On Abnormal To Free T4; Future    7. History of anemia  -     CBC & Differential; Future    8. Circadian rhythm sleep disturbance        Assessment & Plan  1. Anxiety.  Her anxiety appears to be well-managed with the current dosage of Lexapro. She will continue with the current dosage of Lexapro. A prescription for Lexapro will be sent to her pharmacy.    2. Fatigue.  Her fatigue could potentially be attributed to low blood sugar levels, although this does not seem to be a frequent occurrence. It may also be a result of irregular sleep patterns. She is advised to take melatonin 1 to 3 mg nightly for a week, about an hour before bedtime, to help regulate her sleep cycle. If needed, the dosage can be increased to maximum of 10 mg nightly. She is also encouraged to expose herself to sunlight in the morning or use a light therapy box of 10,000 lux initially for 5 to 10 minutes , gradually increasing the duration for up to 30 minutes as tolerated. A comprehensive metabolic panel (CMP), hemoglobin A1c, microalbumin, complete blood count (CBC), and thyroid function tests will be ordered. If her fatigue persists, a referral to a sleep specialist will be considered.    3. Diabetes Mellitus.  Her blood sugar levels have been stable, ranging between 100 and 113, and have not exceeded 130 since starting Ozempic. She has lost 10 pounds in the past 5 months and is currently on a 0.25 mg dose of Ozempic. She has been advised to reschedule with Dr. Harman.  She will continue with her current regimen of Metformin and Ozempic. She is advised to get her labs done, including an A1c test, to ensure her blood sugar levels are within the target range. If her A1c is excessively low, adjustments to her medication regimen may need to be considered.    4.  Hypertension.  At goal continue metoprolol 50 mg  daily    Follow-up  The patient will follow up in 3 months.      Medications Discontinued During This Encounter   Medication Reason    escitalopram (Lexapro) 10 MG tablet Reorder         Follow Up     No follow-ups on file.    Patient was given instructions and counseling regarding her condition or for health maintenance advice. Please see specific information pulled into the AVS if appropriate.     Patient or patient representative verbalized consent for the use of Ambient Listening during the visit with  Polly Martinez MD for chart documentation. 2/7/2025  17:34 EST

## 2025-02-07 ENCOUNTER — OFFICE VISIT (OUTPATIENT)
Dept: FAMILY MEDICINE CLINIC | Facility: CLINIC | Age: 20
End: 2025-02-07
Payer: COMMERCIAL

## 2025-02-07 VITALS
WEIGHT: 191.6 LBS | TEMPERATURE: 97.5 F | OXYGEN SATURATION: 97 % | HEIGHT: 67 IN | SYSTOLIC BLOOD PRESSURE: 122 MMHG | DIASTOLIC BLOOD PRESSURE: 78 MMHG | RESPIRATION RATE: 18 BRPM | BODY MASS INDEX: 30.07 KG/M2 | HEART RATE: 104 BPM

## 2025-02-07 DIAGNOSIS — F41.9 ANXIETY: Primary | ICD-10-CM

## 2025-02-07 DIAGNOSIS — F41.0 PANIC ATTACKS: ICD-10-CM

## 2025-02-07 DIAGNOSIS — R53.83 OTHER FATIGUE: ICD-10-CM

## 2025-02-07 DIAGNOSIS — Z86.2 HISTORY OF ANEMIA: ICD-10-CM

## 2025-02-07 DIAGNOSIS — Z72.0 CURRENT NICOTINE USE: ICD-10-CM

## 2025-02-07 DIAGNOSIS — I10 HYPERTENSION, UNSPECIFIED TYPE: ICD-10-CM

## 2025-02-07 DIAGNOSIS — G47.20 CIRCADIAN RHYTHM SLEEP DISTURBANCE: ICD-10-CM

## 2025-02-07 PROCEDURE — 99214 OFFICE O/P EST MOD 30 MIN: CPT | Performed by: FAMILY MEDICINE

## 2025-02-07 RX ORDER — ESCITALOPRAM OXALATE 10 MG/1
15 TABLET ORAL DAILY
Qty: 135 TABLET | Refills: 3 | Status: SHIPPED | OUTPATIENT
Start: 2025-02-07

## 2025-02-11 LAB
ALBUMIN SERPL-MCNC: 4.9 G/DL (ref 4–5)
ALBUMIN/CREAT UR: 33 MG/G CREAT (ref 0–29)
ALP SERPL-CCNC: 114 IU/L (ref 42–106)
ALT SERPL-CCNC: 171 IU/L (ref 0–32)
AMBIG ABBREV CMP14 DEFAULT: NORMAL
AST SERPL-CCNC: 85 IU/L (ref 0–40)
BILIRUB SERPL-MCNC: 0.4 MG/DL (ref 0–1.2)
BUN SERPL-MCNC: 8 MG/DL (ref 6–20)
BUN/CREAT SERPL: 13 (ref 9–23)
CALCIUM SERPL-MCNC: 9.8 MG/DL (ref 8.7–10.2)
CHLORIDE SERPL-SCNC: 99 MMOL/L (ref 96–106)
CO2 SERPL-SCNC: 20 MMOL/L (ref 20–29)
CREAT SERPL-MCNC: 0.63 MG/DL (ref 0.57–1)
CREAT UR-MCNC: 52.8 MG/DL
EGFRCR SERPLBLD CKD-EPI 2021: 130 ML/MIN/1.73
GLOBULIN SER CALC-MCNC: 3.1 G/DL (ref 1.5–4.5)
GLUCOSE SERPL-MCNC: 96 MG/DL (ref 70–99)
HBA1C MFR BLD: 6.3 % (ref 4.8–5.6)
MICROALBUMIN UR-MCNC: 17.2 UG/ML
POTASSIUM SERPL-SCNC: 4.3 MMOL/L (ref 3.5–5.2)
PROT SERPL-MCNC: 8 G/DL (ref 6–8.5)
SODIUM SERPL-SCNC: 138 MMOL/L (ref 134–144)

## 2025-02-12 ENCOUNTER — TELEPHONE (OUTPATIENT)
Dept: ENDOCRINOLOGY | Facility: CLINIC | Age: 20
End: 2025-02-12
Payer: COMMERCIAL

## 2025-02-12 DIAGNOSIS — F41.0 PANIC ATTACKS: ICD-10-CM

## 2025-02-12 DIAGNOSIS — I10 HYPERTENSION, UNSPECIFIED TYPE: ICD-10-CM

## 2025-02-12 RX ORDER — METOPROLOL SUCCINATE 50 MG/1
50 TABLET, EXTENDED RELEASE ORAL DAILY
Qty: 90 TABLET | Refills: 3 | Status: SHIPPED | OUTPATIENT
Start: 2025-02-12

## 2025-02-12 RX ORDER — ESCITALOPRAM OXALATE 10 MG/1
15 TABLET ORAL DAILY
Qty: 135 TABLET | Refills: 3 | OUTPATIENT
Start: 2025-02-12

## 2025-02-12 NOTE — TELEPHONE ENCOUNTER
Patient wanting to know if she can up her Ozempic to 0.5?  She has tolerated 0.25.  She feels she needs to up the dosage because of her lab results from 02-10-25?    Please advise    Thank you

## 2025-02-14 NOTE — TELEPHONE ENCOUNTER
Care discussed with patient over the phone.  She is not able to tolerate Metformin 1000 mgs daily but able to tolerate 500 mgs daily.  Also using ozempic 0.25 mgs once a week and tolerating.  Plan to increase ozempic dosing.  New therapy:  - Metformin  mgs PO Daily  - Ozempic 0.5 mgs once a week  Will follow as planned.    Ezekiel Harman MD  10:47 EST  02/14/25

## 2025-02-15 DIAGNOSIS — F41.0 PANIC ATTACKS: ICD-10-CM

## 2025-02-17 RX ORDER — ESCITALOPRAM OXALATE 10 MG/1
TABLET ORAL
Qty: 90 TABLET | Refills: 1 | OUTPATIENT
Start: 2025-02-17

## 2025-03-06 DIAGNOSIS — I10 HYPERTENSION, UNSPECIFIED TYPE: ICD-10-CM

## 2025-03-06 RX ORDER — METOPROLOL SUCCINATE 50 MG/1
50 TABLET, EXTENDED RELEASE ORAL DAILY
Qty: 90 TABLET | Refills: 3 | Status: SHIPPED | OUTPATIENT
Start: 2025-03-06

## 2025-05-06 NOTE — PROGRESS NOTES
Chief Complaint  Anxiety and Hypertension    History of Present Illness  Bobo Odom presents today for follow up on anxiety and hypertension    Anxiety/Depression  Associated symptoms include: anxiety   Severity is described per patient : Mild  Patient reports they are taking medications as prescribed and they are not having side effects.    Hypertension  Patient does not check blood pressure at home.   Patient reports  palpitations.   Patient reports they are taking medications as prescribed and they are not having side effects.     Vitals:    05/07/25 1011 05/07/25 1046 05/07/25 1049 05/07/25 1050   Orthostatic BP:  122/72 128/78 116/82   Orthostatic Pulse:  70 68 88   Patient Position: Sitting Lying Sitting Standing        History of Present Illness  The patient is a 19-year-old female who presents for follow-up on hypertension and anxiety.    She has been experiencing palpitations, described as skipped heartbeats, occurring randomly throughout the day. These episodes are often accompanied by dizziness and a sensation of impending syncope, necessitating brief periods of rest. She also reports tachycardia, which she initially attributed to her anxiety. However, these symptoms have persisted even during periods of calm. She has not undergone any cardiovascular testing, including echocardiograms or Holter monitor evaluations. Additionally, she reports a sensation of her heart pounding in her chest, regardless of her vaping habits. She is currently on metoprolol for heart rate control.    For her anxiety, she is currently on Lexapro 10 mg daily, which she reports as effective. However, she has not increased the dosage to 15 mg due to concerns about potential side effects. She experiences tremors during periods of stress or rush, which she feels are not adequately managed by her current medication regimen. She also reports that minor stressors can trigger her anxiety, leading to elevated blood pressure. Despite  these symptoms, she finds Lexapro beneficial and has not experienced any adverse effects. She has been prescribed hydroxyzine for panic attacks but has not required it since starting Lexapro. She expresses apprehension about increasing medication dosages due to fear of side effects.    She is under the care of an endocrinologist, Dr. Harman, with whom she has scheduled a future appointment. She has requested an increase in her Ozempic dosage, which was approved, but she has not yet implemented this change due to a previous adverse reaction. She plans to initiate the increased dosage tomorrow. Her last A1c test was conducted in 02/2025. She is not currently seeing any other specialists. She has lost approximately 4 pounds since 08/2024, with a starting weight of 199 pounds. She maintains a regular gym routine and reports feeling fatigued. She is on Ozempic 0.25 mg and metformin 500 mg once daily.    She is not currently taking norethindrone and will see her gynecology doctor next week.    SOCIAL HISTORY  She vapes and admits to using nicotine in her vape.      Patient Care Team:  Polly Martinez MD as PCP - General (Family Medicine)   Current Outpatient Medications on File Prior to Visit   Medication Sig    escitalopram (Lexapro) 10 MG tablet Take 1.5 tablets by mouth Daily.    glucose blood test strip Use once daily to check glucose level dx E88.819 and R 73.03 brand of insurance preference    glucose monitor monitoring kit Use once daily to check glucose level dx E88.819 and R 73.03 brand of insurance preference    hydrOXYzine (ATARAX) 25 MG tablet Take 1 tablet by mouth 3 (Three) Times a Day As Needed for Anxiety. For panic attacks    Lancets Misc. kit Use once daily to check glucose level dx E88.819 and R 73.03 brand of insurance preference    Lancets misc Use once daily to check glucose level dx E88.819 and R 73.03 brand of insurance preference    metFORMIN ER (GLUCOPHAGE-XR) 500 MG 24 hr tablet Take 2  "tablets by mouth Daily With Breakfast.    metoprolol succinate XL (TOPROL-XL) 50 MG 24 hr tablet Take 1 tablet by mouth Daily.    Semaglutide,0.25 or 0.5MG/DOS, (Ozempic, 0.25 or 0.5 MG/DOSE,) 2 MG/3ML solution pen-injector Inject 0.5 mg under the skin into the appropriate area as directed 1 (One) Time Per Week. Ozempic 0.25mg weekly x 4 weeks then 0.5mg weekly thereafter    norethindrone (AYGESTIN) 5 MG tablet TAKE 2 TABLETS BY MOUTH ONCE A DAY FOR 10 DAYS (Patient not taking: Reported on 5/7/2025)     No current facility-administered medications on file prior to visit.       Objective   Vital Signs:   /68 (BP Location: Right arm, Patient Position: Sitting, Cuff Size: Large Adult)   Pulse 102   Temp 97.8 °F (36.6 °C) (Temporal)   Resp 18   Ht 170.2 cm (67\")   Wt 88.8 kg (195 lb 12.8 oz)   SpO2 95%   BMI 30.67 kg/m²    BP Readings from Last 3 Encounters:   05/07/25 132/68   02/07/25 122/78   09/17/24 126/74     Wt Readings from Last 3 Encounters:   05/07/25 88.8 kg (195 lb 12.8 oz)   02/07/25 86.9 kg (191 lb 9.6 oz)   09/17/24 91.2 kg (201 lb) (97%, Z= 1.96)*     * Growth percentiles are based on CDC (Girls, 2-20 Years) data.     Vitals:    05/07/25 1011 05/07/25 1046 05/07/25 1049 05/07/25 1050   Orthostatic BP:  122/72 128/78 116/82   Orthostatic Pulse:  70 68 88   Patient Position: Sitting Lying Sitting Standing         Physical Exam  Vitals and nursing note reviewed.   Constitutional:       General: She is not in acute distress.     Appearance: Normal appearance. She is well-developed. She is obese. She is not ill-appearing.   HENT:      Head: Normocephalic and atraumatic.   Cardiovascular:      Rate and Rhythm: Normal rate and regular rhythm.      Heart sounds: No murmur heard.  Pulmonary:      Effort: Pulmonary effort is normal.      Breath sounds: Normal breath sounds. No wheezing.   Abdominal:      General: Abdomen is flat. Bowel sounds are normal.      Palpations: Abdomen is soft. "   Musculoskeletal:         General: Normal range of motion.   Skin:     General: Skin is warm and dry.      Findings: No rash.   Neurological:      Mental Status: She is alert and oriented to person, place, and time.   Psychiatric:         Mood and Affect: Mood normal.         Behavior: Behavior normal.         Thought Content: Thought content normal.          Physical Exam  Respiratory: Clear to auscultation, no wheezing, rales or rhonchi  Cardiovascular: Regular rate and rhythm, no murmurs, rubs, or gallops      No visits with results within 1 Day(s) from this visit.   Latest known visit with results is:   Orders Only on 02/10/2025   Component Date Value Ref Range Status    Glucose 02/10/2025 96  70 - 99 mg/dL Final    BUN 02/10/2025 8  6 - 20 mg/dL Final    Creatinine 02/10/2025 0.63  0.57 - 1.00 mg/dL Final    EGFR Result 02/10/2025 130  >59 mL/min/1.73 Final    BUN/Creatinine Ratio 02/10/2025 13  9 - 23 Final    Sodium 02/10/2025 138  134 - 144 mmol/L Final    Potassium 02/10/2025 4.3  3.5 - 5.2 mmol/L Final    Chloride 02/10/2025 99  96 - 106 mmol/L Final    Total CO2 02/10/2025 20  20 - 29 mmol/L Final    Calcium 02/10/2025 9.8  8.7 - 10.2 mg/dL Final    Total Protein 02/10/2025 8.0  6.0 - 8.5 g/dL Final    Albumin 02/10/2025 4.9  4.0 - 5.0 g/dL Final    Globulin 02/10/2025 3.1  1.5 - 4.5 g/dL Final    Total Bilirubin 02/10/2025 0.4  0.0 - 1.2 mg/dL Final    Alkaline Phosphatase 02/10/2025 114 (H)  42 - 106 IU/L Final    AST (SGOT) 02/10/2025 85 (H)  0 - 40 IU/L Final    ALT (SGPT) 02/10/2025 171 (H)  0 - 32 IU/L Final    Creatinine, Urine 02/10/2025 52.8  Not Estab. mg/dL Final    Microalbumin, Urine 02/10/2025 17.2  Not Estab. ug/mL Final    Microalbumin/Creatinine Ratio 02/10/2025 33 (H)  0 - 29 mg/g creat Final    Comment:                        Normal:                0 -  29                         Moderately increased: 30 - 300                         Severely increased:       >300      Hemoglobin A1C  02/10/2025 6.3 (H)  4.8 - 5.6 % Final    Comment:          Prediabetes: 5.7 - 6.4           Diabetes: >6.4           Glycemic control for adults with diabetes: <7.0      Mary Gómez [id number] Default 02/10/2025 Comment   Final    Comment: A hand-written panel/profile was received from your office. In  accordance with the [facility] Ambiguous Test Code Policy dated July 2003, we have completed your order by using the closest currently  or formerly recognized AMA panel.  We have assigned Comprehensive  Metabolic Panel (14), Test Code #091119 to this request.  If this  is not the testing you wished to receive on this specimen, please  contact the [facility] Client Inquiry/Technical Services Department  to clarify the test order.  We appreciate your business.       A1C Last 3 Results          6/4/2024    11:58 2/10/2025    13:48   HGBA1C Last 3 Results   Hemoglobin A1C 6.8  6.3      Lab Results   Component Value Date    CHLPL 230 (H) 06/04/2024    TRIG 171 (H) 06/04/2024    HDL 52 06/04/2024     (H) 06/04/2024     Lab Results   Component Value Date    TSH 1.680 02/10/2025     Lab Results   Component Value Date    GLUCOSE 96 02/10/2025    BUN 8 02/10/2025    CREATININE 0.63 02/10/2025    BCR 13 02/10/2025    K 4.3 02/10/2025    CO2 20 02/10/2025    CALCIUM 9.8 02/10/2025    ALBUMIN 4.9 02/10/2025    AST 85 (H) 02/10/2025     (H) 02/10/2025     Lab Results   Component Value Date    WBC 9.4 02/10/2025    HGB 15.1 02/10/2025    HCT 46.8 (H) 02/10/2025    MCV 88 02/10/2025     02/10/2025             Results  Labs   - A1c: 02/2025, 6.3      ECG 12 Lead    Date/Time: 5/7/2025 10:48 AM  Performed by: Polly Martinez MD    Authorized by: Polly Martinez MD  Comparison: not compared with previous ECG   Previous ECG: no previous ECG available  Rhythm: sinus rhythm  Rate: normal  BPM: 71  Conduction: conduction normal  ST Segments: ST segments normal  T Waves: T waves normal  QRS axis:  normal  Other: no other findings    Clinical impression: normal ECG              Assessment and Plan    Diagnoses and all orders for this visit:    1. Anxiety (Primary)    2. Hypertension, unspecified type  -     Holter Monitor - 72 Hour Up To 15 Days; Future  -     ECG 12 Lead    3. BMI 30.0-30.9,adult    4. Current nicotine use    5. Palpitations  -     Holter Monitor - 72 Hour Up To 15 Days; Future  -     ECG 12 Lead    6. Pre-syncope  -     Holter Monitor - 72 Hour Up To 15 Days; Future  -     ECG 12 Lead    7. Prediabetes    8. POTS (postural orthostatic tachycardia syndrome)        Assessment & Plan  1. Hypertension.  - Blood pressure readings today are 132/68, which is within the normal range but could benefit from a slight reduction.  - Orthostatic blood pressure measurements taken today did not show significant drop upon standing but she did have an elevation in her heart rate from 70 up to 88 consistent with POTS syndrome.  - Blood pressure monitoring will continue to ensure optimal control.    2. Anxiety.  - Currently on Lexapro 10 mg daily, which has helped but not fully controlled her symptoms.  - Advised to increase Lexapro dosage to 15 mg daily.  - Encouraged to engage in relaxation techniques, stress reduction strategies, deep breathing exercises, walking, meditation, and grounding exercises. Recommended use of the Calm melody and Breathe melody.  - Hydroxyzine can be taken at bedtime or during periods of extreme stress when a nap is needed.    3. Palpitations.  - Reports experiencing palpitations, dizziness, and near syncope almost daily.  - An EKG performed today showed normal sinus rhythm.  - Orthostatic blood pressure measurements taken today did not show significant drop upon standing but she did have an elevation in her heart rate from 70 up to 88 consistent with POTS syndrome.  - A Holter monitor has been ordered to assess for any arrhythmias.  - Advised to reduce nicotine intake gradually to goal  of switching to non-nicotine vapes.  - If the Holter monitor does not provide sufficient information, a referral to a cardiologist for further evaluation may be necessary.    4. Diabetes mellitus.  - A1c level recorded as 6.3 in 02/2025, indicating an improvement from the previous level of 6.8 obtained 11 months ago.  - Currently taking metformin 500 mg once a day and Ozempic 0.25 mg. Plans to increase Ozempic dose to 0.5 mg starting tomorrow.  - Continued monitoring of A1c and thyroid labs by endocrinology.    5. Medication management.  - Not currently taking norethindrone and will discuss its continuation with her new gynecologist next week.  - Metformin dosage noted as 500 mg once a day, contrary to previous prescription of twice a day.  She will discuss with endocrinologist during the next appointment.      There are no discontinued medications.      Follow Up     Return in about 1 month (around 6/7/2025) for Holtor monitor/palpitations and anxiety.    Patient was given instructions and counseling regarding her condition or for health maintenance advice. Please see specific information pulled into the AVS if appropriate.     Patient or patient representative verbalized consent for the use of Ambient Listening during the visit with  Polly Martinez MD for chart documentation. 5/7/2025  11:14 EDT

## 2025-05-07 ENCOUNTER — OFFICE VISIT (OUTPATIENT)
Dept: FAMILY MEDICINE CLINIC | Facility: CLINIC | Age: 20
End: 2025-05-07
Payer: COMMERCIAL

## 2025-05-07 VITALS
HEART RATE: 102 BPM | BODY MASS INDEX: 30.73 KG/M2 | OXYGEN SATURATION: 95 % | TEMPERATURE: 97.8 F | WEIGHT: 195.8 LBS | DIASTOLIC BLOOD PRESSURE: 68 MMHG | SYSTOLIC BLOOD PRESSURE: 132 MMHG | HEIGHT: 67 IN | RESPIRATION RATE: 18 BRPM

## 2025-05-07 DIAGNOSIS — R00.2 PALPITATIONS: ICD-10-CM

## 2025-05-07 DIAGNOSIS — Z72.0 CURRENT NICOTINE USE: ICD-10-CM

## 2025-05-07 DIAGNOSIS — F41.9 ANXIETY: Primary | ICD-10-CM

## 2025-05-07 DIAGNOSIS — I10 HYPERTENSION, UNSPECIFIED TYPE: ICD-10-CM

## 2025-05-07 DIAGNOSIS — G90.A POTS (POSTURAL ORTHOSTATIC TACHYCARDIA SYNDROME): ICD-10-CM

## 2025-05-07 DIAGNOSIS — R73.03 PREDIABETES: ICD-10-CM

## 2025-05-07 DIAGNOSIS — R55 PRE-SYNCOPE: ICD-10-CM

## 2025-05-07 PROCEDURE — 99214 OFFICE O/P EST MOD 30 MIN: CPT | Performed by: FAMILY MEDICINE

## 2025-05-07 PROCEDURE — 93000 ELECTROCARDIOGRAM COMPLETE: CPT | Performed by: FAMILY MEDICINE

## 2025-05-08 ENCOUNTER — HOSPITAL ENCOUNTER (OUTPATIENT)
Dept: CARDIOLOGY | Facility: HOSPITAL | Age: 20
Discharge: HOME | End: 2025-05-08
Admitting: FAMILY MEDICINE
Payer: COMMERCIAL

## 2025-05-08 DIAGNOSIS — I10 HYPERTENSION, UNSPECIFIED TYPE: ICD-10-CM

## 2025-05-08 DIAGNOSIS — R00.2 PALPITATIONS: ICD-10-CM

## 2025-05-08 DIAGNOSIS — R55 PRE-SYNCOPE: ICD-10-CM

## 2025-05-08 PROCEDURE — 93242 EXT ECG>48HR<7D RECORDING: CPT

## 2025-05-30 LAB
CV ZIO BASELINE AVG BPM: 80 BPM
CV ZIO BASELINE BPM HIGH: 162 BPM
CV ZIO BASELINE BPM LOW: 45 BPM
CV ZIO DEVICE ANALYSIS TIME: NORMAL
CV ZIO ECT SVE COUNT: 72 EPISODES
CV ZIO ECT SVE CPLT COUNT: 0 EPISODES
CV ZIO ECT SVE CPLT FREQ: 0
CV ZIO ECT SVE FREQ: NORMAL
CV ZIO ECT SVE TPLT COUNT: 0 EPISODES
CV ZIO ECT SVE TPLT FREQ: 0
CV ZIO ECT VE COUNT: 31 EPISODES
CV ZIO ECT VE CPLT COUNT: 0 EPISODES
CV ZIO ECT VE CPLT FREQ: 0
CV ZIO ECT VE FREQ: NORMAL
CV ZIO ECT VE TPLT COUNT: 0 EPISODES
CV ZIO ECT VE TPLT FREQ: 0
CV ZIO ECTOPIC SVE COUPLET RAW PERCENT: 0 %
CV ZIO ECTOPIC SVE ISOLATED PERCENT: 0.01 %
CV ZIO ECTOPIC SVE TRIPLET RAW PERCENT: 0 %
CV ZIO ECTOPIC VE COUPLET RAW PERCENT: 0 %
CV ZIO ECTOPIC VE ISOLATED PERCENT: 0 %
CV ZIO ECTOPIC VE TRIPLET RAW PERCENT: 0 %
CV ZIO ENROLLMENT END: NORMAL
CV ZIO ENROLLMENT START: NORMAL
CV ZIO PATIENT EVENTS DIARIES: 0
CV ZIO PATIENT EVENTS TRIGGERS: 12
CV ZIO PAUSE COUNT: 0
CV ZIO PRESCRIPTION STATUS: NORMAL
CV ZIO SVT COUNT: 0
CV ZIO TOTAL  ENROLLMENT PERIOD: NORMAL
CV ZIO VT COUNT: 0

## 2025-06-18 ENCOUNTER — OFFICE VISIT (OUTPATIENT)
Dept: FAMILY MEDICINE CLINIC | Facility: CLINIC | Age: 20
End: 2025-06-18
Payer: COMMERCIAL

## 2025-06-18 VITALS
SYSTOLIC BLOOD PRESSURE: 132 MMHG | HEART RATE: 83 BPM | HEIGHT: 67 IN | BODY MASS INDEX: 31.04 KG/M2 | TEMPERATURE: 96.8 F | DIASTOLIC BLOOD PRESSURE: 78 MMHG | OXYGEN SATURATION: 98 % | WEIGHT: 197.8 LBS | RESPIRATION RATE: 18 BRPM

## 2025-06-18 DIAGNOSIS — G47.10 EXCESSIVE SLEEPINESS: ICD-10-CM

## 2025-06-18 DIAGNOSIS — E28.2 PCOS (POLYCYSTIC OVARIAN SYNDROME): ICD-10-CM

## 2025-06-18 DIAGNOSIS — F41.9 ANXIETY: ICD-10-CM

## 2025-06-18 DIAGNOSIS — N92.6 IRREGULAR MENSTRUATION: ICD-10-CM

## 2025-06-18 DIAGNOSIS — Z72.0 CURRENT NICOTINE USE: ICD-10-CM

## 2025-06-18 DIAGNOSIS — F41.0 PANIC ATTACKS: ICD-10-CM

## 2025-06-18 DIAGNOSIS — R00.2 PALPITATIONS: Primary | ICD-10-CM

## 2025-06-18 RX ORDER — ESCITALOPRAM OXALATE 20 MG/1
20 TABLET ORAL DAILY
Qty: 90 TABLET | Refills: 3 | Status: SHIPPED | OUTPATIENT
Start: 2025-06-18

## 2025-06-18 RX ORDER — HYDROXYZINE HYDROCHLORIDE 25 MG/1
TABLET, FILM COATED ORAL
Qty: 20 TABLET | Refills: 1 | Status: SHIPPED | OUTPATIENT
Start: 2025-06-18

## 2025-06-18 NOTE — PROGRESS NOTES
Chief Complaint  Palpitations and Anxiety    History of Present Illness  Bboo Odom presents today for follow up on palpitations and anxiety     Palpitations: Patient complains of palpitations.  The symptoms are of mild and intermittent severity. Cardiac risk factors include: hypertension and obesity (BMI >= 30 kg/m2). Aggravating factors: caffeine, stress/anxiety. Relieving factors: none. Associated signs and symptoms: none    Anxiety/Depression  Associated symptoms include: depressed mood and anxiety   Severity is described per patient : Mild  Patient reports they are taking medications as prescribed and they are not having side effects.    History of Present Illness  The patient is a 19-year-old female here to follow up on palpitations, her Holter monitor results, anxiety, and depression. She reports taking her prescribed medications without experiencing any side effects. She is accompanied by her grandmother.    She experienced several episodes of palpitations both during and after the use of the Holter monitor.  She describes an episode where she woke up in the middle of the night with severe chest tightness and pain, feeling as if she was dying, which led to an emergency room visit.This occurred approximately 2 days before the removal of the Holter monitor. An EKG performed at the ER was negative and emergency department physician diagnosed her with  a panic attack.  She has had previous attacks but have not presented with pressure in her chest previously.  She describes the sensation as akin to someone sitting on her chest, accompanied by difficulty breathing. Her usual panic attacks are characterized by severe chest pain and hyperventilation, but this episode was different. She is currently on Lexapro 10 mg 1-1/2 tablets daily and has been prescribed hydroxyzine for panic attacks, which she has not taken recently due to the absence of such attacks. She recalls taking hydroxyzine once during a previous  episode when she was experiencing severe anxiety, which resulted in excessive drowsiness. Subsequently, she reduced the dose to half, which still induced sleepiness but was more manageable. She did not have her medication with her during the recent episode. She reports that Lexapro generally controls her anxiety, but she still experiences some residual jitteriness. She is considering increasing her Lexapro dose to 20 mg to better manage her symptoms.    She is under the care of Dr. Harman, endocrinologist, and is prescribed metformin and semaglutide. However, she has not taken semaglutide for the past month due to a previous adverse reaction of significant nausea. She plans to resume the medication at a dose of 0.5 mg, as advised by Dr. Harman's nurse. She has a history of polycystic ovary syndrome (PCOS) and diabetes. Her A1c levels improved significantly when she was previously on semaglutide.    She is seeking a new OB-GYN provider due to the [facility] of her previous doctor, Dr. Robertson. She is experiencing irregular menstrual cycles, with spotting occurring last night. She uses the [employer] melody to track her cycles but does not consider the spotting to be a full period as it does not last for multiple days. She is open to seeing a nurse practitioner.    Her grandmother has expressed concern about her excessive sleepiness, questioning whether it could be a side effect of her medication. She has a long-standing history of excessive sleepiness, often sleeping for 14 hours straight. She struggles with napping, as even an 8-hour nap feels insufficient. She often feels fatigued throughout the day, even after sleeping for extended periods.  Patient reports this has been an issue since elementary school and does not believe it related to any of her medications.  She has had her iron levels checked in the past, which were found to be within normal limits.    GYNECOLOGICAL HISTORY:  - Frequency and Flow: Irregular,  "spotting      Patient Care Team:  Polly Martinez MD as PCP - General (Family Medicine)   Current Outpatient Medications on File Prior to Visit   Medication Sig    glucose blood test strip Use once daily to check glucose level dx E88.819 and R 73.03 brand of insurance preference    glucose monitor monitoring kit Use once daily to check glucose level dx E88.819 and R 73.03 brand of insurance preference    Lancets Misc. kit Use once daily to check glucose level dx E88.819 and R 73.03 brand of insurance preference    Lancets misc Use once daily to check glucose level dx E88.819 and R 73.03 brand of insurance preference    metFORMIN ER (GLUCOPHAGE-XR) 500 MG 24 hr tablet Take 2 tablets by mouth Daily With Breakfast. (Patient taking differently: Take 1 tablet by mouth Daily With Breakfast.)    metoprolol succinate XL (TOPROL-XL) 50 MG 24 hr tablet Take 1 tablet by mouth Daily.    [DISCONTINUED] escitalopram (Lexapro) 10 MG tablet Take 1.5 tablets by mouth Daily.    [DISCONTINUED] hydrOXYzine (ATARAX) 25 MG tablet Take 1 tablet by mouth 3 (Three) Times a Day As Needed for Anxiety. For panic attacks    norethindrone (AYGESTIN) 5 MG tablet TAKE 2 TABLETS BY MOUTH ONCE A DAY FOR 10 DAYS (Patient not taking: Reported on 2/7/2025)    Semaglutide,0.25 or 0.5MG/DOS, (Ozempic, 0.25 or 0.5 MG/DOSE,) 2 MG/3ML solution pen-injector Inject 0.5 mg under the skin into the appropriate area as directed 1 (One) Time Per Week. Ozempic 0.25mg weekly x 4 weeks then 0.5mg weekly thereafter (Patient not taking: Reported on 6/18/2025)     No current facility-administered medications on file prior to visit.       Objective   Vital Signs:   /78 (BP Location: Right arm, Patient Position: Sitting, Cuff Size: Large Adult)   Pulse 83   Temp 96.8 °F (36 °C) (Temporal)   Resp 18   Ht 170.2 cm (67\")   Wt 89.7 kg (197 lb 12.8 oz)   SpO2 98%   BMI 30.98 kg/m²    BP Readings from Last 3 Encounters:   06/18/25 132/78   05/07/25 132/68 "   02/07/25 122/78     Wt Readings from Last 3 Encounters:   06/18/25 89.7 kg (197 lb 12.8 oz)   05/07/25 88.8 kg (195 lb 12.8 oz)   02/07/25 86.9 kg (191 lb 9.6 oz)         Physical Exam  Vitals and nursing note reviewed.   Constitutional:       General: She is not in acute distress.     Appearance: Normal appearance. She is well-developed. She is obese. She is not ill-appearing.   HENT:      Head: Normocephalic and atraumatic.   Cardiovascular:      Rate and Rhythm: Normal rate and regular rhythm.      Heart sounds: No murmur heard.  Pulmonary:      Effort: Pulmonary effort is normal.      Breath sounds: Normal breath sounds. No wheezing.   Abdominal:      General: Abdomen is flat. Bowel sounds are normal.      Palpations: Abdomen is soft.   Musculoskeletal:         General: Normal range of motion.   Skin:     General: Skin is warm and dry.      Findings: No rash.   Neurological:      Mental Status: She is alert and oriented to person, place, and time.   Psychiatric:         Mood and Affect: Mood normal.         Behavior: Behavior normal.         Thought Content: Thought content normal.          Physical Exam        No visits with results within 1 Day(s) from this visit.   Latest known visit with results is:   Hospital Outpatient Visit on 05/08/2025   Component Date Value Ref Range Status    Heart rate minimum 05/08/2025 45  bpm Final    Heart rate maximum 05/08/2025 162  bpm Final    Heart rate (average) 05/08/2025 80  bpm Final    V-Tach - number of episodes 05/08/2025 0   Final    SVT - number of episodes 05/08/2025 0   Final    Pause - number of episodes 05/08/2025 0   Final    Isolated SVE frequency 05/08/2025 Rare   Final    Isolated SVE count 05/08/2025 72  episodes Final    Ectopic SVE isolated percent 05/08/2025 0.01  % Final    SVE couplets frequency 05/08/2025 0   Final    SVE couplets counts 05/08/2025 0  episodes Final    Ectopic SVE couplet percent 05/08/2025 0  % Final    SVE triplets frequency  05/08/2025 0   Final    SVE triplets counts 05/08/2025 0  episodes Final    Ectopic SVE triplet percent 05/08/2025 0  % Final    Isolated VE frequency 05/08/2025 Rare   Final    Isolated VE counts 05/08/2025 31  episodes Final    Ectopic VE isolated percent 05/08/2025 0  % Final    VE couplets frequency 05/08/2025 0   Final    VE couplets counts 05/08/2025 0  episodes Final    Ectopic VE couplet percent 05/08/2025 0  % Final    VE triplets frequency 05/08/2025 0   Final    VE triplets counts 05/08/2025 0  episodes Final    Ectopic VE triplet percent 05/08/2025 0  % Final    Enrollment period start 05/08/2025 05/08/2025  9:45:24 AM EDT   Final    Enrollment period end 05/08/2025 05/15/2025  12:15:40 PM EDT   Final    Total enrollment period 05/08/2025 7 days 3 hours   Final    Device analysis time 05/08/2025 7 days 0 hours   Final    Total patient event diaries 05/08/2025 0   Final    Total patient event triggers 05/08/2025 12   Final    Combined report prescription status 05/08/2025 COMPLETE   Final     A1C Last 3 Results          2/10/2025    13:48   HGBA1C Last 3 Results   Hemoglobin A1C 6.3      Lab Results   Component Value Date    CHLPL 230 (H) 06/04/2024    TRIG 171 (H) 06/04/2024    HDL 52 06/04/2024     (H) 06/04/2024     Lab Results   Component Value Date    TSH 1.680 02/10/2025     Lab Results   Component Value Date    GLUCOSE 96 02/10/2025    BUN 8 02/10/2025    CREATININE 0.63 02/10/2025    BCR 13 02/10/2025    K 4.3 02/10/2025    CO2 20 02/10/2025    CALCIUM 9.8 02/10/2025    ALBUMIN 4.9 02/10/2025    AST 85 (H) 02/10/2025     (H) 02/10/2025     Lab Results   Component Value Date    WBC 9.4 02/10/2025    HGB 15.1 02/10/2025    HCT 46.8 (H) 02/10/2025    MCV 88 02/10/2025     02/10/2025             Results  Labs   - A1c: 02/2025, 6.3    Imaging   - Chest x-ray: Normal    Diagnostic Testing   - Holter monitor: 05/27/2025, Normal with no evidence of atrial arrhythmias or episodes of  supraventricular tachycardia or ventricular tachycardia   - EKG: Normal             Assessment and Plan    Diagnoses and all orders for this visit:    1. Palpitations (Primary)    2. Anxiety  -     hydrOXYzine (ATARAX) 25 MG tablet; 1/2 to 1 tablet every 8 hours if needed for panic attacks  Dispense: 20 tablet; Refill: 1    3. Panic attacks  -     hydrOXYzine (ATARAX) 25 MG tablet; 1/2 to 1 tablet every 8 hours if needed for panic attacks  Dispense: 20 tablet; Refill: 1  -     escitalopram (Lexapro) 20 MG tablet; Take 1 tablet by mouth Daily.  Dispense: 90 tablet; Refill: 3    4. BMI 30.0-30.9,adult    5. Current nicotine use    6. Excessive sleepiness  -     Ambulatory Referral to Sleep Medicine    7. Irregular menstruation  -     Ambulatory Referral to Obstetrics / Gynecology    8. PCOS (polycystic ovarian syndrome)  -     Ambulatory Referral to Obstetrics / Gynecology        Assessment & Plan  1. Palpitations.  - The Holter monitor results were normal, showing no evidence of atrial arrhythmias or episodes of supraventricular tachycardia or ventricular tachycardia.  - Experienced palpitations during and after the monitoring period, leading to an ER visit where she was diagnosed with a panic attack.  - Advised to manage anxiety to prevent future ER visits for similar symptoms.  - No further cardiac workup needed at this time.    2. Anxiety.  - Currently taking Lexapro 15 mg daily and hydroxyzine for panic attacks.  - Reports Lexapro helps but still feels jittery; dosage will be increased to 20 mg daily.  - New prescription for hydroxyzine with updated directions (0.5 to 1 tablet every 8 hours as needed) will be sent to pharmacy.  - Advised to keep a small amount of hydroxyzine in her overnight bag for as needed use when away from home.    3. Depression.  - Reports taking medications as prescribed without side effects.  - Current treatment plan includes Lexapro, which will be increased to 20 mg daily to better  manage symptoms.  - No additional medications or changes needed at this time.    4. Diabetes mellitus.  - A1c level was 6.3 in 02/2025, indicating prediabetes.  - Advised to restart semaglutide at 0.25 mg for at least one dose before increasing to 0.5 mg if tolerated.  - If nausea occurs, continue with 0.25 mg for at least 4 weeks before considering an increase.  - Encouraged to monitor blood glucose levels and follow up with endocrinologist as needed.    5. Irregular periods.  - Reports spotting and irregular periods.  - Referral to Dr. Kelly, an OB-GYN, will be made, with a note indicating preference for a female provider.  - Advised to track menstrual cycle and report any significant changes.    6. Excessive sleepiness.  - Reports sleeping excessively, up to 14 hours a day, and still feeling tired.  - Referral to a sleep specialist will be made to evaluate for potential sleep disorders.  - Recommended to consider a sleep study to assess for conditions such as sleep apnea.      Medications Discontinued During This Encounter   Medication Reason    hydrOXYzine (ATARAX) 25 MG tablet Reorder    escitalopram (Lexapro) 10 MG tablet Reorder         Follow Up     Return in about 3 months (around 9/18/2025) for Anxiety.    Patient was given instructions and counseling regarding her condition or for health maintenance advice. Please see specific information pulled into the AVS if appropriate.     Patient or patient representative verbalized consent for the use of Ambient Listening during the visit with  Polly Martinez MD for chart documentation. 6/18/2025  20:40 EDT

## 2025-08-13 DIAGNOSIS — E11.65 TYPE 2 DIABETES MELLITUS WITH HYPERGLYCEMIA, WITHOUT LONG-TERM CURRENT USE OF INSULIN: ICD-10-CM

## 2025-08-13 DIAGNOSIS — F41.0 PANIC ATTACKS: ICD-10-CM

## 2025-08-13 DIAGNOSIS — I10 HYPERTENSION, UNSPECIFIED TYPE: ICD-10-CM

## 2025-08-13 RX ORDER — ESCITALOPRAM OXALATE 20 MG/1
20 TABLET ORAL DAILY
Qty: 90 TABLET | Refills: 3 | Status: SHIPPED | OUTPATIENT
Start: 2025-08-13

## 2025-08-13 RX ORDER — METOPROLOL SUCCINATE 50 MG/1
50 TABLET, EXTENDED RELEASE ORAL DAILY
Qty: 90 TABLET | Refills: 3 | Status: SHIPPED | OUTPATIENT
Start: 2025-08-13

## 2025-08-14 RX ORDER — SEMAGLUTIDE 0.68 MG/ML
0.5 INJECTION, SOLUTION SUBCUTANEOUS WEEKLY
Qty: 9 ML | Refills: 1 | OUTPATIENT
Start: 2025-08-14